# Patient Record
Sex: FEMALE | Race: WHITE | NOT HISPANIC OR LATINO | Employment: OTHER | URBAN - METROPOLITAN AREA
[De-identification: names, ages, dates, MRNs, and addresses within clinical notes are randomized per-mention and may not be internally consistent; named-entity substitution may affect disease eponyms.]

---

## 2019-08-26 ENCOUNTER — RECORDS - HEALTHEAST (OUTPATIENT)
Dept: LAB | Facility: CLINIC | Age: 79
End: 2019-08-26

## 2019-08-26 LAB
ANION GAP SERPL CALCULATED.3IONS-SCNC: 9 MMOL/L (ref 5–18)
BUN SERPL-MCNC: 23 MG/DL (ref 8–28)
CALCIUM SERPL-MCNC: 9.7 MG/DL (ref 8.5–10.5)
CALCIUM, IONIZED MEASURED: 1.35 MMOL/L (ref 1.11–1.3)
CHLORIDE BLD-SCNC: 104 MMOL/L (ref 98–107)
CO2 SERPL-SCNC: 22 MMOL/L (ref 22–31)
CREAT SERPL-MCNC: 1.37 MG/DL (ref 0.6–1.1)
ERYTHROCYTE [DISTWIDTH] IN BLOOD BY AUTOMATED COUNT: 15.2 % (ref 11–14.5)
GFR SERPL CREATININE-BSD FRML MDRD: 37 ML/MIN/1.73M2
GLUCOSE BLD-MCNC: 184 MG/DL (ref 70–125)
HCT VFR BLD AUTO: 32 % (ref 35–47)
HGB BLD-MCNC: 10.3 G/DL (ref 12–16)
ION CA PH 7.4: 1.27 MMOL/L (ref 1.11–1.3)
MCH RBC QN AUTO: 28.1 PG (ref 27–34)
MCHC RBC AUTO-ENTMCNC: 32.2 G/DL (ref 32–36)
MCV RBC AUTO: 87 FL (ref 80–100)
PH: 7.25 (ref 7.35–7.45)
PHOSPHATE SERPL-MCNC: 2.5 MG/DL (ref 2.5–4.5)
PLATELET # BLD AUTO: 260 THOU/UL (ref 140–440)
PMV BLD AUTO: 12.5 FL (ref 8.5–12.5)
POTASSIUM BLD-SCNC: 4.4 MMOL/L (ref 3.5–5)
RBC # BLD AUTO: 3.67 MILL/UL (ref 3.8–5.4)
SODIUM SERPL-SCNC: 135 MMOL/L (ref 136–145)
TSH SERPL DL<=0.005 MIU/L-ACNC: 1.09 UIU/ML (ref 0.3–5)
VIT B12 SERPL-MCNC: 361 PG/ML (ref 213–816)
WBC: 7.3 THOU/UL (ref 4–11)

## 2019-08-30 ENCOUNTER — RECORDS - HEALTHEAST (OUTPATIENT)
Dept: LAB | Facility: CLINIC | Age: 79
End: 2019-08-30

## 2019-09-03 LAB
CALCIUM SERPL-MCNC: 8.1 MG/DL (ref 8.5–10.5)
FERRITIN SERPL-MCNC: 31 NG/ML (ref 10–130)
HGB BLD-MCNC: 8.4 G/DL (ref 12–16)
IRON SATN MFR SERPL: 15 % (ref 20–50)
IRON SERPL-MCNC: 33 UG/DL (ref 42–175)
TIBC SERPL-MCNC: 214 UG/DL (ref 313–563)
TRANSFERRIN SERPL-MCNC: 171 MG/DL (ref 212–360)

## 2020-04-06 ENCOUNTER — AMBULATORY - HEALTHEAST (OUTPATIENT)
Dept: GERIATRICS | Facility: CLINIC | Age: 80
End: 2020-04-06

## 2020-04-07 ENCOUNTER — AMBULATORY - HEALTHEAST (OUTPATIENT)
Dept: ADMINISTRATIVE | Facility: CLINIC | Age: 80
End: 2020-04-07

## 2020-04-07 ENCOUNTER — AMBULATORY - HEALTHEAST (OUTPATIENT)
Dept: GERIATRICS | Facility: CLINIC | Age: 80
End: 2020-04-07

## 2020-04-07 ENCOUNTER — OFFICE VISIT - HEALTHEAST (OUTPATIENT)
Dept: GERIATRICS | Facility: CLINIC | Age: 80
End: 2020-04-07

## 2020-04-07 ENCOUNTER — COMMUNICATION - HEALTHEAST (OUTPATIENT)
Dept: GERIATRICS | Facility: CLINIC | Age: 80
End: 2020-04-07

## 2020-04-07 DIAGNOSIS — F32.89 OTHER DEPRESSION: ICD-10-CM

## 2020-04-07 DIAGNOSIS — G89.29 CHRONIC MIDLINE LOW BACK PAIN WITH SCIATICA, SCIATICA LATERALITY UNSPECIFIED: ICD-10-CM

## 2020-04-07 DIAGNOSIS — M54.40 CHRONIC MIDLINE LOW BACK PAIN WITH SCIATICA, SCIATICA LATERALITY UNSPECIFIED: ICD-10-CM

## 2020-04-07 DIAGNOSIS — K21.9 GASTROESOPHAGEAL REFLUX DISEASE, ESOPHAGITIS PRESENCE NOT SPECIFIED: ICD-10-CM

## 2020-04-07 DIAGNOSIS — D64.9 ANEMIA, UNSPECIFIED TYPE: ICD-10-CM

## 2020-04-07 DIAGNOSIS — E83.52 HYPERCALCEMIA: ICD-10-CM

## 2020-04-07 DIAGNOSIS — R52 PAIN: ICD-10-CM

## 2020-04-07 DIAGNOSIS — N17.9 AKI (ACUTE KIDNEY INJURY) (H): ICD-10-CM

## 2020-04-07 RX ORDER — TRAZODONE HYDROCHLORIDE 150 MG/1
150 TABLET ORAL AT BEDTIME
Status: SHIPPED | COMMUNITY
Start: 2020-04-07

## 2020-04-09 ENCOUNTER — COMMUNICATION - HEALTHEAST (OUTPATIENT)
Dept: GERIATRICS | Facility: CLINIC | Age: 80
End: 2020-04-09

## 2020-04-10 ENCOUNTER — OFFICE VISIT - HEALTHEAST (OUTPATIENT)
Dept: GERIATRICS | Facility: CLINIC | Age: 80
End: 2020-04-10

## 2020-04-10 DIAGNOSIS — E83.52 HYPERCALCEMIA: ICD-10-CM

## 2020-04-10 DIAGNOSIS — N17.9 AKI (ACUTE KIDNEY INJURY) (H): ICD-10-CM

## 2020-04-10 DIAGNOSIS — K59.03 DRUG-INDUCED CONSTIPATION: ICD-10-CM

## 2020-04-13 ENCOUNTER — AMBULATORY - HEALTHEAST (OUTPATIENT)
Dept: GERIATRICS | Facility: CLINIC | Age: 80
End: 2020-04-13

## 2021-01-01 ENCOUNTER — LAB REQUISITION (OUTPATIENT)
Dept: LAB | Facility: CLINIC | Age: 81
End: 2021-01-01
Payer: COMMERCIAL

## 2021-01-01 DIAGNOSIS — E83.52 HYPERCALCEMIA: ICD-10-CM

## 2021-01-01 DIAGNOSIS — K92.2 GASTROINTESTINAL HEMORRHAGE, UNSPECIFIED: ICD-10-CM

## 2021-01-01 DIAGNOSIS — N18.4 CHRONIC KIDNEY DISEASE, STAGE 4 (SEVERE) (H): ICD-10-CM

## 2021-01-01 DIAGNOSIS — R35.0 FREQUENCY OF MICTURITION: ICD-10-CM

## 2021-01-01 DIAGNOSIS — D64.9 ANEMIA, UNSPECIFIED: ICD-10-CM

## 2021-01-01 DIAGNOSIS — I10 ESSENTIAL (PRIMARY) HYPERTENSION: ICD-10-CM

## 2021-01-01 DIAGNOSIS — M48.061 SPINAL STENOSIS, LUMBAR REGION WITHOUT NEUROGENIC CLAUDICATION: ICD-10-CM

## 2021-01-01 DIAGNOSIS — R53.1 WEAKNESS: ICD-10-CM

## 2021-01-01 DIAGNOSIS — N18.9 CHRONIC KIDNEY DISEASE, UNSPECIFIED: ICD-10-CM

## 2021-01-01 DIAGNOSIS — Z94.0 KIDNEY TRANSPLANT STATUS: ICD-10-CM

## 2021-01-01 DIAGNOSIS — N17.9 ACUTE KIDNEY FAILURE, UNSPECIFIED (H): ICD-10-CM

## 2021-01-01 DIAGNOSIS — K92.0 HEMATEMESIS: ICD-10-CM

## 2021-01-01 LAB
ALBUMIN UR-MCNC: 100 MG/DL
ALBUMIN UR-MCNC: 200 MG/DL
ANION GAP SERPL CALCULATED.3IONS-SCNC: 10 MMOL/L (ref 5–18)
ANION GAP SERPL CALCULATED.3IONS-SCNC: 6 MMOL/L (ref 5–18)
ANION GAP SERPL CALCULATED.3IONS-SCNC: 7 MMOL/L (ref 5–18)
ANION GAP SERPL CALCULATED.3IONS-SCNC: 7 MMOL/L (ref 5–18)
ANION GAP SERPL CALCULATED.3IONS-SCNC: 8 MMOL/L (ref 5–18)
ANION GAP SERPL CALCULATED.3IONS-SCNC: 8 MMOL/L (ref 5–18)
ANION GAP SERPL CALCULATED.3IONS-SCNC: 9 MMOL/L (ref 5–18)
APPEARANCE UR: CLEAR
APPEARANCE UR: CLEAR
BACTERIA #/AREA URNS HPF: ABNORMAL /HPF
BACTERIA #/AREA URNS HPF: ABNORMAL /HPF
BACTERIA UR CULT: ABNORMAL
BACTERIA UR CULT: ABNORMAL
BASOPHILS # BLD AUTO: 0 10E3/UL (ref 0–0.2)
BASOPHILS NFR BLD AUTO: 1 %
BILIRUB UR QL STRIP: NEGATIVE
BILIRUB UR QL STRIP: NEGATIVE
BUN SERPL-MCNC: 30 MG/DL (ref 8–28)
BUN SERPL-MCNC: 30 MG/DL (ref 8–28)
BUN SERPL-MCNC: 32 MG/DL (ref 8–28)
BUN SERPL-MCNC: 34 MG/DL (ref 8–28)
BUN SERPL-MCNC: 38 MG/DL (ref 8–28)
BUN SERPL-MCNC: 43 MG/DL (ref 8–28)
BUN SERPL-MCNC: 46 MG/DL (ref 8–28)
BUN SERPL-MCNC: 47 MG/DL (ref 8–28)
BUN SERPL-MCNC: 50 MG/DL (ref 8–28)
CALCIUM SERPL-MCNC: 8.6 MG/DL (ref 8.5–10.5)
CALCIUM SERPL-MCNC: 8.7 MG/DL (ref 8.5–10.5)
CALCIUM SERPL-MCNC: 8.7 MG/DL (ref 8.5–10.5)
CALCIUM SERPL-MCNC: 8.9 MG/DL (ref 8.5–10.5)
CALCIUM SERPL-MCNC: 9.1 MG/DL (ref 8.5–10.5)
CALCIUM SERPL-MCNC: 9.1 MG/DL (ref 8.5–10.5)
CALCIUM SERPL-MCNC: 9.4 MG/DL (ref 8.5–10.5)
CALCIUM SERPL-MCNC: 9.4 MG/DL (ref 8.5–10.5)
CALCIUM SERPL-MCNC: 9.7 MG/DL (ref 8.5–10.5)
CHLORIDE BLD-SCNC: 109 MMOL/L (ref 98–107)
CHLORIDE BLD-SCNC: 110 MMOL/L (ref 98–107)
CHLORIDE BLD-SCNC: 112 MMOL/L (ref 98–107)
CHLORIDE BLD-SCNC: 114 MMOL/L (ref 98–107)
CHLORIDE BLD-SCNC: 114 MMOL/L (ref 98–107)
CHLORIDE BLD-SCNC: 116 MMOL/L (ref 98–107)
CHLORIDE BLD-SCNC: 116 MMOL/L (ref 98–107)
CHLORIDE BLD-SCNC: 117 MMOL/L (ref 98–107)
CHLORIDE BLD-SCNC: 118 MMOL/L (ref 98–107)
CO2 SERPL-SCNC: 15 MMOL/L (ref 22–31)
CO2 SERPL-SCNC: 16 MMOL/L (ref 22–31)
CO2 SERPL-SCNC: 17 MMOL/L (ref 22–31)
CO2 SERPL-SCNC: 17 MMOL/L (ref 22–31)
CO2 SERPL-SCNC: 18 MMOL/L (ref 22–31)
CO2 SERPL-SCNC: 19 MMOL/L (ref 22–31)
CO2 SERPL-SCNC: 21 MMOL/L (ref 22–31)
COLOR UR AUTO: ABNORMAL
COLOR UR AUTO: ABNORMAL
CREAT SERPL-MCNC: 1.66 MG/DL (ref 0.6–1.1)
CREAT SERPL-MCNC: 1.69 MG/DL (ref 0.6–1.1)
CREAT SERPL-MCNC: 1.74 MG/DL (ref 0.6–1.1)
CREAT SERPL-MCNC: 1.77 MG/DL (ref 0.6–1.1)
CREAT SERPL-MCNC: 1.79 MG/DL (ref 0.6–1.1)
CREAT SERPL-MCNC: 1.96 MG/DL (ref 0.6–1.1)
CREAT SERPL-MCNC: 2.11 MG/DL (ref 0.6–1.1)
CREAT SERPL-MCNC: 2.23 MG/DL (ref 0.6–1.1)
CREAT SERPL-MCNC: 2.53 MG/DL (ref 0.6–1.1)
EOSINOPHIL # BLD AUTO: 0.1 10E3/UL (ref 0–0.7)
EOSINOPHIL NFR BLD AUTO: 2 %
ERYTHROCYTE [DISTWIDTH] IN BLOOD BY AUTOMATED COUNT: 16.2 % (ref 10–15)
ERYTHROCYTE [DISTWIDTH] IN BLOOD BY AUTOMATED COUNT: 18.3 % (ref 10–15)
ERYTHROCYTE [DISTWIDTH] IN BLOOD BY AUTOMATED COUNT: 19.9 % (ref 10–15)
ERYTHROCYTE [DISTWIDTH] IN BLOOD BY AUTOMATED COUNT: 20.4 % (ref 10–15)
ERYTHROCYTE [DISTWIDTH] IN BLOOD BY AUTOMATED COUNT: 21.6 % (ref 10–15)
ERYTHROCYTE [SEDIMENTATION RATE] IN BLOOD BY WESTERGREN METHOD: 22 MM/HR (ref 0–20)
FERRITIN SERPL-MCNC: 23 NG/ML (ref 10–130)
GFR SERPL CREATININE-BSD FRML MDRD: 17 ML/MIN/1.73M2
GFR SERPL CREATININE-BSD FRML MDRD: 20 ML/MIN/1.73M2
GFR SERPL CREATININE-BSD FRML MDRD: 21 ML/MIN/1.73M2
GFR SERPL CREATININE-BSD FRML MDRD: 23 ML/MIN/1.73M2
GFR SERPL CREATININE-BSD FRML MDRD: 26 ML/MIN/1.73M2
GFR SERPL CREATININE-BSD FRML MDRD: 26 ML/MIN/1.73M2
GFR SERPL CREATININE-BSD FRML MDRD: 27 ML/MIN/1.73M2
GFR SERPL CREATININE-BSD FRML MDRD: 30 ML/MIN/1.73M2
GFR SERPL CREATININE-BSD FRML MDRD: 30 ML/MIN/1.73M2
GLUCOSE BLD-MCNC: 106 MG/DL (ref 70–125)
GLUCOSE BLD-MCNC: 124 MG/DL (ref 70–125)
GLUCOSE BLD-MCNC: 126 MG/DL (ref 70–125)
GLUCOSE BLD-MCNC: 127 MG/DL (ref 70–125)
GLUCOSE BLD-MCNC: 142 MG/DL (ref 70–125)
GLUCOSE BLD-MCNC: 171 MG/DL (ref 70–125)
GLUCOSE BLD-MCNC: 210 MG/DL (ref 70–125)
GLUCOSE BLD-MCNC: 76 MG/DL (ref 70–125)
GLUCOSE BLD-MCNC: 84 MG/DL (ref 70–125)
GLUCOSE UR STRIP-MCNC: NEGATIVE MG/DL
GLUCOSE UR STRIP-MCNC: NEGATIVE MG/DL
HAPTOGLOB SERPL-MCNC: 175 MG/DL (ref 33–171)
HCT VFR BLD AUTO: 22.7 % (ref 35–47)
HCT VFR BLD AUTO: 24.5 % (ref 35–47)
HCT VFR BLD AUTO: 28.3 % (ref 35–47)
HCT VFR BLD AUTO: 28.4 % (ref 35–47)
HCT VFR BLD AUTO: 29.5 % (ref 35–47)
HEMOCCULT STL QL: POSITIVE
HGB BLD-MCNC: 7 G/DL (ref 11.7–15.7)
HGB BLD-MCNC: 7.6 G/DL (ref 11.7–15.7)
HGB BLD-MCNC: 8.7 G/DL (ref 11.7–15.7)
HGB BLD-MCNC: 9 G/DL (ref 11.7–15.7)
HGB BLD-MCNC: 9.5 G/DL (ref 11.7–15.7)
HGB UR QL STRIP: ABNORMAL
HGB UR QL STRIP: NEGATIVE
IMM GRANULOCYTES # BLD: 0.1 10E3/UL
IMM GRANULOCYTES NFR BLD: 2 %
IRON SATN MFR SERPL: 10 % (ref 20–50)
IRON SERPL-MCNC: 25 UG/DL (ref 42–175)
KETONES UR STRIP-MCNC: NEGATIVE MG/DL
KETONES UR STRIP-MCNC: NEGATIVE MG/DL
LEUKOCYTE ESTERASE UR QL STRIP: ABNORMAL
LEUKOCYTE ESTERASE UR QL STRIP: ABNORMAL
LYMPHOCYTES # BLD AUTO: 1.3 10E3/UL (ref 0.8–5.3)
LYMPHOCYTES NFR BLD AUTO: 22 %
MCH RBC QN AUTO: 30.7 PG (ref 26.5–33)
MCH RBC QN AUTO: 31 PG (ref 26.5–33)
MCH RBC QN AUTO: 31.5 PG (ref 26.5–33)
MCH RBC QN AUTO: 31.8 PG (ref 26.5–33)
MCH RBC QN AUTO: 32.5 PG (ref 26.5–33)
MCHC RBC AUTO-ENTMCNC: 30.6 G/DL (ref 31.5–36.5)
MCHC RBC AUTO-ENTMCNC: 30.8 G/DL (ref 31.5–36.5)
MCHC RBC AUTO-ENTMCNC: 31 G/DL (ref 31.5–36.5)
MCHC RBC AUTO-ENTMCNC: 31.8 G/DL (ref 31.5–36.5)
MCHC RBC AUTO-ENTMCNC: 32.2 G/DL (ref 31.5–36.5)
MCV RBC AUTO: 100 FL (ref 78–100)
MCV RBC AUTO: 100 FL (ref 78–100)
MCV RBC AUTO: 105 FL (ref 78–100)
MCV RBC AUTO: 99 FL (ref 78–100)
MCV RBC AUTO: 99 FL (ref 78–100)
MONOCYTES # BLD AUTO: 0.6 10E3/UL (ref 0–1.3)
MONOCYTES NFR BLD AUTO: 10 %
MUCOUS THREADS #/AREA URNS LPF: PRESENT /LPF
NEUTROPHILS # BLD AUTO: 3.9 10E3/UL (ref 1.6–8.3)
NEUTROPHILS NFR BLD AUTO: 63 %
NITRATE UR QL: NEGATIVE
NITRATE UR QL: NEGATIVE
NRBC # BLD AUTO: 0 10E3/UL
NRBC BLD AUTO-RTO: 1 /100
PH UR STRIP: 5.5 [PH] (ref 5–7)
PH UR STRIP: 6.5 [PH] (ref 5–7)
PLATELET # BLD AUTO: 143 10E3/UL (ref 150–450)
PLATELET # BLD AUTO: 184 10E3/UL (ref 150–450)
PLATELET # BLD AUTO: 202 10E3/UL (ref 150–450)
PLATELET # BLD AUTO: 242 10E3/UL (ref 150–450)
PLATELET # BLD AUTO: 313 10E3/UL (ref 150–450)
POTASSIUM BLD-SCNC: 4 MMOL/L (ref 3.5–5)
POTASSIUM BLD-SCNC: 4.1 MMOL/L (ref 3.5–5)
POTASSIUM BLD-SCNC: 4.1 MMOL/L (ref 3.5–5)
POTASSIUM BLD-SCNC: 4.2 MMOL/L (ref 3.5–5)
POTASSIUM BLD-SCNC: 4.3 MMOL/L (ref 3.5–5)
POTASSIUM BLD-SCNC: 4.4 MMOL/L (ref 3.5–5)
POTASSIUM BLD-SCNC: 4.4 MMOL/L (ref 3.5–5)
POTASSIUM BLD-SCNC: 4.7 MMOL/L (ref 3.5–5)
POTASSIUM BLD-SCNC: 5 MMOL/L (ref 3.5–5)
RBC # BLD AUTO: 2.26 10E6/UL (ref 3.8–5.2)
RBC # BLD AUTO: 2.34 10E6/UL (ref 3.8–5.2)
RBC # BLD AUTO: 2.83 10E6/UL (ref 3.8–5.2)
RBC # BLD AUTO: 2.86 10E6/UL (ref 3.8–5.2)
RBC # BLD AUTO: 2.99 10E6/UL (ref 3.8–5.2)
RBC URINE: 1 /HPF
RBC URINE: <1 /HPF
RETICS # AUTO: 0.1 10E6/UL (ref 0.01–0.11)
RETICS/RBC NFR AUTO: 4.2 % (ref 0.8–2.7)
SODIUM SERPL-SCNC: 136 MMOL/L (ref 136–145)
SODIUM SERPL-SCNC: 138 MMOL/L (ref 136–145)
SODIUM SERPL-SCNC: 139 MMOL/L (ref 136–145)
SODIUM SERPL-SCNC: 140 MMOL/L (ref 136–145)
SODIUM SERPL-SCNC: 140 MMOL/L (ref 136–145)
SODIUM SERPL-SCNC: 141 MMOL/L (ref 136–145)
SODIUM SERPL-SCNC: 142 MMOL/L (ref 136–145)
SP GR UR STRIP: 1.01 (ref 1–1.03)
SP GR UR STRIP: 1.01 (ref 1–1.03)
SQUAMOUS EPITHELIAL: <1 /HPF
SQUAMOUS EPITHELIAL: <1 /HPF
TIBC SERPL-MCNC: 246 UG/DL (ref 313–563)
TOTAL PROTEIN SERUM FOR ELP: 4.6 G/DL (ref 6–8)
TRANSFERRIN SERPL-MCNC: 197 MG/DL (ref 212–360)
TSH SERPL DL<=0.005 MIU/L-ACNC: 2.42 UIU/ML (ref 0.3–5)
UROBILINOGEN UR STRIP-MCNC: <2 MG/DL
UROBILINOGEN UR STRIP-MCNC: <2 MG/DL
VIT B12 SERPL-MCNC: 213 PG/ML (ref 213–816)
WBC # BLD AUTO: 10.9 10E3/UL (ref 4–11)
WBC # BLD AUTO: 5.9 10E3/UL (ref 4–11)
WBC # BLD AUTO: 6.1 10E3/UL (ref 4–11)
WBC # BLD AUTO: 7.2 10E3/UL (ref 4–11)
WBC # BLD AUTO: 7.4 10E3/UL (ref 4–11)
WBC URINE: 34 /HPF
WBC URINE: 53 /HPF

## 2021-01-01 PROCEDURE — 85027 COMPLETE CBC AUTOMATED: CPT | Mod: ORL | Performed by: NURSE PRACTITIONER

## 2021-01-01 PROCEDURE — 84466 ASSAY OF TRANSFERRIN: CPT | Mod: ORL | Performed by: NURSE PRACTITIONER

## 2021-01-01 PROCEDURE — 84165 PROTEIN E-PHORESIS SERUM: CPT | Mod: TC,ORL | Performed by: NURSE PRACTITIONER

## 2021-01-01 PROCEDURE — 82728 ASSAY OF FERRITIN: CPT | Mod: ORL | Performed by: NURSE PRACTITIONER

## 2021-01-01 PROCEDURE — P9604 ONE-WAY ALLOW PRORATED TRIP: HCPCS | Performed by: FAMILY MEDICINE

## 2021-01-01 PROCEDURE — 83010 ASSAY OF HAPTOGLOBIN QUANT: CPT | Mod: ORL | Performed by: NURSE PRACTITIONER

## 2021-01-01 PROCEDURE — 36415 COLL VENOUS BLD VENIPUNCTURE: CPT | Performed by: NURSE PRACTITIONER

## 2021-01-01 PROCEDURE — P9604 ONE-WAY ALLOW PRORATED TRIP: HCPCS | Mod: ORL | Performed by: NURSE PRACTITIONER

## 2021-01-01 PROCEDURE — 85018 HEMOGLOBIN: CPT | Mod: ORL | Performed by: NURSE PRACTITIONER

## 2021-01-01 PROCEDURE — 36415 COLL VENOUS BLD VENIPUNCTURE: CPT | Performed by: FAMILY MEDICINE

## 2021-01-01 PROCEDURE — 82310 ASSAY OF CALCIUM: CPT | Performed by: FAMILY MEDICINE

## 2021-01-01 PROCEDURE — 80048 BASIC METABOLIC PNL TOTAL CA: CPT | Mod: ORL | Performed by: NURSE PRACTITIONER

## 2021-01-01 PROCEDURE — 36415 COLL VENOUS BLD VENIPUNCTURE: CPT | Mod: ORL | Performed by: NURSE PRACTITIONER

## 2021-01-01 PROCEDURE — 81001 URINALYSIS AUTO W/SCOPE: CPT | Mod: ORL | Performed by: NURSE PRACTITIONER

## 2021-01-01 PROCEDURE — 85027 COMPLETE CBC AUTOMATED: CPT | Performed by: FAMILY MEDICINE

## 2021-01-01 PROCEDURE — 80048 BASIC METABOLIC PNL TOTAL CA: CPT | Performed by: NURSE PRACTITIONER

## 2021-01-01 PROCEDURE — 85045 AUTOMATED RETICULOCYTE COUNT: CPT | Mod: ORL | Performed by: NURSE PRACTITIONER

## 2021-01-01 PROCEDURE — 80048 BASIC METABOLIC PNL TOTAL CA: CPT | Performed by: FAMILY MEDICINE

## 2021-01-01 PROCEDURE — 84165 PROTEIN E-PHORESIS SERUM: CPT | Mod: 26 | Performed by: PATHOLOGY

## 2021-01-01 PROCEDURE — P9604 ONE-WAY ALLOW PRORATED TRIP: HCPCS | Performed by: NURSE PRACTITIONER

## 2021-01-01 PROCEDURE — P9603 ONE-WAY ALLOW PRORATED MILES: HCPCS | Performed by: NURSE PRACTITIONER

## 2021-01-01 PROCEDURE — 82272 OCCULT BLD FECES 1-3 TESTS: CPT | Mod: ORL | Performed by: NURSE PRACTITIONER

## 2021-01-01 PROCEDURE — 84443 ASSAY THYROID STIM HORMONE: CPT | Mod: ORL | Performed by: NURSE PRACTITIONER

## 2021-01-01 PROCEDURE — 81001 URINALYSIS AUTO W/SCOPE: CPT | Mod: ORL | Performed by: INTERNAL MEDICINE

## 2021-01-01 PROCEDURE — 82310 ASSAY OF CALCIUM: CPT | Performed by: NURSE PRACTITIONER

## 2021-01-01 PROCEDURE — P9603 ONE-WAY ALLOW PRORATED MILES: HCPCS | Mod: ORL | Performed by: NURSE PRACTITIONER

## 2021-01-01 PROCEDURE — 85025 COMPLETE CBC W/AUTO DIFF WBC: CPT | Mod: ORL | Performed by: NURSE PRACTITIONER

## 2021-01-01 PROCEDURE — 82607 VITAMIN B-12: CPT | Mod: ORL | Performed by: NURSE PRACTITIONER

## 2021-01-01 PROCEDURE — 85652 RBC SED RATE AUTOMATED: CPT | Mod: ORL | Performed by: NURSE PRACTITIONER

## 2021-01-01 PROCEDURE — 84155 ASSAY OF PROTEIN SERUM: CPT | Mod: ORL | Performed by: NURSE PRACTITIONER

## 2021-01-01 PROCEDURE — 87088 URINE BACTERIA CULTURE: CPT | Mod: ORL | Performed by: NURSE PRACTITIONER

## 2021-01-22 ENCOUNTER — AMBULATORY - HEALTHEAST (OUTPATIENT)
Dept: GERIATRICS | Facility: CLINIC | Age: 81
End: 2021-01-22

## 2021-01-22 RX ORDER — FAMOTIDINE 40 MG/1
40 TABLET, FILM COATED ORAL DAILY
Status: SHIPPED | COMMUNITY
Start: 2021-01-22 | End: 2021-07-26

## 2021-01-25 ENCOUNTER — RECORDS - HEALTHEAST (OUTPATIENT)
Dept: LAB | Facility: CLINIC | Age: 81
End: 2021-01-25

## 2021-01-25 ENCOUNTER — OFFICE VISIT - HEALTHEAST (OUTPATIENT)
Dept: GERIATRICS | Facility: CLINIC | Age: 81
End: 2021-01-25

## 2021-01-25 DIAGNOSIS — R21 RASH: ICD-10-CM

## 2021-01-25 DIAGNOSIS — N17.9 AKI (ACUTE KIDNEY INJURY) (H): ICD-10-CM

## 2021-01-25 DIAGNOSIS — R62.7 FTT (FAILURE TO THRIVE) IN ADULT: ICD-10-CM

## 2021-01-25 ASSESSMENT — MIFFLIN-ST. JEOR: SCORE: 965.72

## 2021-01-26 ENCOUNTER — OFFICE VISIT - HEALTHEAST (OUTPATIENT)
Dept: GERIATRICS | Facility: CLINIC | Age: 81
End: 2021-01-26

## 2021-01-26 DIAGNOSIS — R21 RASH: ICD-10-CM

## 2021-01-26 DIAGNOSIS — R62.7 FTT (FAILURE TO THRIVE) IN ADULT: ICD-10-CM

## 2021-01-26 LAB
ANION GAP SERPL CALCULATED.3IONS-SCNC: 9 MMOL/L (ref 5–18)
BUN SERPL-MCNC: 37 MG/DL (ref 8–28)
CALCIUM SERPL-MCNC: 9.1 MG/DL (ref 8.5–10.5)
CHLORIDE BLD-SCNC: 99 MMOL/L (ref 98–107)
CO2 SERPL-SCNC: 23 MMOL/L (ref 22–31)
CREAT SERPL-MCNC: 1.83 MG/DL (ref 0.6–1.1)
ERYTHROCYTE [DISTWIDTH] IN BLOOD BY AUTOMATED COUNT: 15.9 % (ref 11–14.5)
GFR SERPL CREATININE-BSD FRML MDRD: 26 ML/MIN/1.73M2
GLUCOSE BLD-MCNC: 97 MG/DL (ref 70–125)
HCT VFR BLD AUTO: 28.4 % (ref 35–47)
HGB BLD-MCNC: 9.4 G/DL (ref 12–16)
MAGNESIUM SERPL-MCNC: 2.1 MG/DL (ref 1.8–2.6)
MCH RBC QN AUTO: 26.3 PG (ref 27–34)
MCHC RBC AUTO-ENTMCNC: 33.1 G/DL (ref 32–36)
MCV RBC AUTO: 79 FL (ref 80–100)
PLATELET # BLD AUTO: 185 THOU/UL (ref 140–440)
PMV BLD AUTO: 12.7 FL (ref 8.5–12.5)
POTASSIUM BLD-SCNC: 4.1 MMOL/L (ref 3.5–5)
RBC # BLD AUTO: 3.58 MILL/UL (ref 3.8–5.4)
SODIUM SERPL-SCNC: 131 MMOL/L (ref 136–145)
WBC: 6.2 THOU/UL (ref 4–11)

## 2021-01-26 ASSESSMENT — MIFFLIN-ST. JEOR: SCORE: 965.72

## 2021-01-27 ENCOUNTER — RECORDS - HEALTHEAST (OUTPATIENT)
Dept: LAB | Facility: CLINIC | Age: 81
End: 2021-01-27

## 2021-01-27 ENCOUNTER — OFFICE VISIT - HEALTHEAST (OUTPATIENT)
Dept: GERIATRICS | Facility: CLINIC | Age: 81
End: 2021-01-27

## 2021-01-27 DIAGNOSIS — R21 RASH: ICD-10-CM

## 2021-01-27 DIAGNOSIS — R62.7 FTT (FAILURE TO THRIVE) IN ADULT: ICD-10-CM

## 2021-01-27 LAB
ALBUMIN UR-MCNC: ABNORMAL MG/DL
AMORPH CRY #/AREA URNS HPF: ABNORMAL /[HPF]
APPEARANCE UR: CLEAR
BACTERIA #/AREA URNS HPF: ABNORMAL HPF
BILIRUB UR QL STRIP: NEGATIVE
COLOR UR AUTO: ABNORMAL
GLUCOSE UR STRIP-MCNC: ABNORMAL MG/DL
HGB UR QL STRIP: NEGATIVE
KETONES UR STRIP-MCNC: NEGATIVE MG/DL
LEUKOCYTE ESTERASE UR QL STRIP: NEGATIVE
NITRATE UR QL: NEGATIVE
OSMOLALITY UR: 329 MOSM/KG (ref 300–900)
PH UR STRIP: 5.5 [PH] (ref 4.5–8)
RBC #/AREA URNS AUTO: ABNORMAL HPF
SODIUM UR-SCNC: <20 MMOL/L
SP GR UR STRIP: 1.01 (ref 1–1.03)
SQUAMOUS #/AREA URNS AUTO: ABNORMAL LPF
UROBILINOGEN UR STRIP-ACNC: ABNORMAL
WBC #/AREA URNS AUTO: ABNORMAL HPF

## 2021-01-27 ASSESSMENT — MIFFLIN-ST. JEOR: SCORE: 965.72

## 2021-01-28 ENCOUNTER — OFFICE VISIT - HEALTHEAST (OUTPATIENT)
Dept: GERIATRICS | Facility: CLINIC | Age: 81
End: 2021-01-28

## 2021-01-28 DIAGNOSIS — B02.9 HERPES ZOSTER WITHOUT COMPLICATION: ICD-10-CM

## 2021-01-28 DIAGNOSIS — E87.1 HYPONATREMIA: ICD-10-CM

## 2021-01-28 DIAGNOSIS — D64.9 ANEMIA, UNSPECIFIED TYPE: ICD-10-CM

## 2021-01-28 DIAGNOSIS — R62.7 FTT (FAILURE TO THRIVE) IN ADULT: ICD-10-CM

## 2021-01-28 LAB
ANION GAP SERPL CALCULATED.3IONS-SCNC: 7 MMOL/L (ref 5–18)
BUN SERPL-MCNC: 37 MG/DL (ref 8–28)
CALCIUM SERPL-MCNC: 8.8 MG/DL (ref 8.5–10.5)
CHLORIDE BLD-SCNC: 103 MMOL/L (ref 98–107)
CO2 SERPL-SCNC: 23 MMOL/L (ref 22–31)
CREAT SERPL-MCNC: 1.43 MG/DL (ref 0.6–1.1)
GFR SERPL CREATININE-BSD FRML MDRD: 35 ML/MIN/1.73M2
GLUCOSE BLD-MCNC: 119 MG/DL (ref 70–125)
HGB BLD-MCNC: 8.4 G/DL (ref 12–16)
POTASSIUM BLD-SCNC: 3.9 MMOL/L (ref 3.5–5)
SODIUM SERPL-SCNC: 133 MMOL/L (ref 136–145)

## 2021-01-28 ASSESSMENT — MIFFLIN-ST. JEOR: SCORE: 920.36

## 2021-01-30 ENCOUNTER — RECORDS - HEALTHEAST (OUTPATIENT)
Dept: LAB | Facility: CLINIC | Age: 81
End: 2021-01-30

## 2021-01-30 LAB
ALBUMIN UR-MCNC: ABNORMAL MG/DL
APPEARANCE UR: ABNORMAL
BACTERIA #/AREA URNS HPF: ABNORMAL HPF
BILIRUB UR QL STRIP: NEGATIVE
COLOR UR AUTO: YELLOW
GLUCOSE UR STRIP-MCNC: ABNORMAL MG/DL
HGB UR QL STRIP: ABNORMAL
KETONES UR STRIP-MCNC: NEGATIVE MG/DL
LEUKOCYTE ESTERASE UR QL STRIP: ABNORMAL
NITRATE UR QL: NEGATIVE
PH UR STRIP: 6 [PH] (ref 4.5–8)
RBC #/AREA URNS AUTO: ABNORMAL HPF
SP GR UR STRIP: 1.01 (ref 1–1.03)
SQUAMOUS #/AREA URNS AUTO: ABNORMAL LPF
TRANS CELLS #/AREA URNS HPF: ABNORMAL LPF
UROBILINOGEN UR STRIP-ACNC: ABNORMAL
WBC #/AREA URNS AUTO: ABNORMAL HPF
WBC CLUMPS #/AREA URNS HPF: PRESENT /[HPF]

## 2021-01-31 ENCOUNTER — RECORDS - HEALTHEAST (OUTPATIENT)
Dept: LAB | Facility: CLINIC | Age: 81
End: 2021-01-31

## 2021-01-31 LAB
ALBUMIN UR-MCNC: ABNORMAL MG/DL
AMORPH CRY #/AREA URNS HPF: ABNORMAL /[HPF]
APPEARANCE UR: ABNORMAL
BACTERIA #/AREA URNS HPF: ABNORMAL HPF
BILIRUB UR QL STRIP: NEGATIVE
COLOR UR AUTO: YELLOW
GLUCOSE UR STRIP-MCNC: ABNORMAL MG/DL
HGB UR QL STRIP: NEGATIVE
KETONES UR STRIP-MCNC: NEGATIVE MG/DL
LEUKOCYTE ESTERASE UR QL STRIP: ABNORMAL
MUCOUS THREADS #/AREA URNS LPF: ABNORMAL LPF
NITRATE UR QL: NEGATIVE
PH UR STRIP: 6 [PH] (ref 4.5–8)
RBC #/AREA URNS AUTO: ABNORMAL HPF
SP GR UR STRIP: 1.01 (ref 1–1.03)
SQUAMOUS #/AREA URNS AUTO: ABNORMAL LPF
UROBILINOGEN UR STRIP-ACNC: ABNORMAL
WBC #/AREA URNS AUTO: ABNORMAL HPF

## 2021-02-02 ENCOUNTER — RECORDS - HEALTHEAST (OUTPATIENT)
Dept: LAB | Facility: CLINIC | Age: 81
End: 2021-02-02

## 2021-02-02 ENCOUNTER — OFFICE VISIT - HEALTHEAST (OUTPATIENT)
Dept: GERIATRICS | Facility: CLINIC | Age: 81
End: 2021-02-02

## 2021-02-02 DIAGNOSIS — G89.29 CHRONIC MIDLINE LOW BACK PAIN WITH SCIATICA, SCIATICA LATERALITY UNSPECIFIED: ICD-10-CM

## 2021-02-02 DIAGNOSIS — R62.7 FTT (FAILURE TO THRIVE) IN ADULT: ICD-10-CM

## 2021-02-02 DIAGNOSIS — M54.40 CHRONIC MIDLINE LOW BACK PAIN WITH SCIATICA, SCIATICA LATERALITY UNSPECIFIED: ICD-10-CM

## 2021-02-02 DIAGNOSIS — J18.9 COMMUNITY ACQUIRED PNEUMONIA, UNSPECIFIED LATERALITY: ICD-10-CM

## 2021-02-02 DIAGNOSIS — F51.01 PRIMARY INSOMNIA: ICD-10-CM

## 2021-02-02 DIAGNOSIS — R21 RASH: ICD-10-CM

## 2021-02-02 DIAGNOSIS — E87.1 HYPONATREMIA: ICD-10-CM

## 2021-02-02 DIAGNOSIS — N18.30 STAGE 3 CHRONIC KIDNEY DISEASE, UNSPECIFIED WHETHER STAGE 3A OR 3B CKD (H): ICD-10-CM

## 2021-02-02 DIAGNOSIS — F11.20 UNCOMPLICATED OPIOID DEPENDENCE (H): ICD-10-CM

## 2021-02-02 DIAGNOSIS — M48.061 LUMBAR FORAMINAL STENOSIS: ICD-10-CM

## 2021-02-02 DIAGNOSIS — B02.9 HERPES ZOSTER WITHOUT COMPLICATION: ICD-10-CM

## 2021-02-02 LAB
ANION GAP SERPL CALCULATED.3IONS-SCNC: 8 MMOL/L (ref 5–18)
BUN SERPL-MCNC: 26 MG/DL (ref 8–28)
CALCIUM SERPL-MCNC: 8.9 MG/DL (ref 8.5–10.5)
CHLORIDE BLD-SCNC: 108 MMOL/L (ref 98–107)
CO2 SERPL-SCNC: 22 MMOL/L (ref 22–31)
CREAT SERPL-MCNC: 1.49 MG/DL (ref 0.6–1.1)
ERYTHROCYTE [DISTWIDTH] IN BLOOD BY AUTOMATED COUNT: 16.8 % (ref 11–14.5)
GFR SERPL CREATININE-BSD FRML MDRD: 34 ML/MIN/1.73M2
GLUCOSE BLD-MCNC: 102 MG/DL (ref 70–125)
HCT VFR BLD AUTO: 22.3 % (ref 35–47)
HGB BLD-MCNC: 7 G/DL (ref 12–16)
MCH RBC QN AUTO: 25.8 PG (ref 27–34)
MCHC RBC AUTO-ENTMCNC: 31.4 G/DL (ref 32–36)
MCV RBC AUTO: 82 FL (ref 80–100)
OSMOLALITY SERPL: 289 MOSM/KG (ref 270–300)
PLATELET # BLD AUTO: 277 THOU/UL (ref 140–440)
PMV BLD AUTO: 11.4 FL (ref 8.5–12.5)
POTASSIUM BLD-SCNC: 4.4 MMOL/L (ref 3.5–5)
RBC # BLD AUTO: 2.71 MILL/UL (ref 3.8–5.4)
SODIUM SERPL-SCNC: 138 MMOL/L (ref 136–145)
WBC: 6.8 THOU/UL (ref 4–11)

## 2021-02-02 ASSESSMENT — MIFFLIN-ST. JEOR: SCORE: 974.34

## 2021-02-03 ENCOUNTER — RECORDS - HEALTHEAST (OUTPATIENT)
Dept: ADMINISTRATIVE | Facility: OTHER | Age: 81
End: 2021-02-03

## 2021-02-03 ENCOUNTER — OFFICE VISIT - HEALTHEAST (OUTPATIENT)
Dept: GERIATRICS | Facility: CLINIC | Age: 81
End: 2021-02-03

## 2021-02-03 DIAGNOSIS — D64.9 ANEMIA, UNSPECIFIED TYPE: ICD-10-CM

## 2021-02-03 DIAGNOSIS — R21 RASH: ICD-10-CM

## 2021-02-03 LAB — HGB BLD-MCNC: 7.1 G/DL (ref 12–16)

## 2021-02-03 RX ORDER — GABAPENTIN 100 MG/1
300 CAPSULE ORAL 2 TIMES DAILY
Status: SHIPPED | COMMUNITY
Start: 2021-02-03 | End: 2021-07-26

## 2021-02-03 ASSESSMENT — MIFFLIN-ST. JEOR: SCORE: 974.34

## 2021-02-04 ENCOUNTER — RECORDS - HEALTHEAST (OUTPATIENT)
Dept: LAB | Facility: CLINIC | Age: 81
End: 2021-02-04

## 2021-02-04 LAB
ALBUMIN UR-MCNC: ABNORMAL MG/DL
AMORPH CRY #/AREA URNS HPF: ABNORMAL /[HPF]
APPEARANCE UR: CLEAR
BACTERIA #/AREA URNS HPF: ABNORMAL HPF
BILIRUB UR QL STRIP: NEGATIVE
CAOX CRY #/AREA URNS HPF: PRESENT /[HPF]
COLOR UR AUTO: ABNORMAL
GLUCOSE UR STRIP-MCNC: ABNORMAL MG/DL
HGB UR QL STRIP: NEGATIVE
KETONES UR STRIP-MCNC: NEGATIVE MG/DL
LEUKOCYTE ESTERASE UR QL STRIP: ABNORMAL
NITRATE UR QL: NEGATIVE
OSMOLALITY UR: 378 MOSM/KG (ref 300–900)
PH UR STRIP: 6 [PH] (ref 4.5–8)
RBC #/AREA URNS AUTO: ABNORMAL HPF
SODIUM UR-SCNC: 45 MMOL/L
SP GR UR STRIP: 1.01 (ref 1–1.03)
SQUAMOUS #/AREA URNS AUTO: ABNORMAL LPF
UROBILINOGEN UR STRIP-ACNC: ABNORMAL
WBC #/AREA URNS AUTO: ABNORMAL HPF

## 2021-02-05 ENCOUNTER — COMMUNICATION - HEALTHEAST (OUTPATIENT)
Dept: GERIATRICS | Facility: CLINIC | Age: 81
End: 2021-02-05

## 2021-02-05 LAB
FERRITIN SERPL-MCNC: 14 NG/ML (ref 10–130)
HGB BLD-MCNC: 7.2 G/DL (ref 12–16)
IRON SATN MFR SERPL: 13 % (ref 20–50)
IRON SERPL-MCNC: 28 UG/DL (ref 42–175)
IRON SERPL-MCNC: 28 UG/DL (ref 42–175)
TIBC SERPL-MCNC: 222 UG/DL (ref 313–563)
TRANSFERRIN SERPL-MCNC: 178 MG/DL (ref 212–360)
VIT B12 SERPL-MCNC: 307 PG/ML (ref 213–816)

## 2021-02-08 ENCOUNTER — AMBULATORY - HEALTHEAST (OUTPATIENT)
Dept: GERIATRICS | Facility: CLINIC | Age: 81
End: 2021-02-08

## 2021-04-29 ENCOUNTER — OFFICE VISIT - HEALTHEAST (OUTPATIENT)
Dept: GERIATRICS | Facility: CLINIC | Age: 81
End: 2021-04-29

## 2021-04-29 ENCOUNTER — RECORDS - HEALTHEAST (OUTPATIENT)
Dept: LAB | Facility: CLINIC | Age: 81
End: 2021-04-29

## 2021-04-29 DIAGNOSIS — G89.29 CHRONIC MIDLINE LOW BACK PAIN WITH SCIATICA, SCIATICA LATERALITY UNSPECIFIED: ICD-10-CM

## 2021-04-29 DIAGNOSIS — F33.1 MODERATE EPISODE OF RECURRENT MAJOR DEPRESSIVE DISORDER (H): ICD-10-CM

## 2021-04-29 DIAGNOSIS — B02.29 POST HERPETIC NEURALGIA: ICD-10-CM

## 2021-04-29 DIAGNOSIS — M54.40 CHRONIC MIDLINE LOW BACK PAIN WITH SCIATICA, SCIATICA LATERALITY UNSPECIFIED: ICD-10-CM

## 2021-04-29 LAB
SARS-COV-2 PCR COMMENT: NORMAL
SARS-COV-2 RNA SPEC QL NAA+PROBE: NEGATIVE
SARS-COV-2 VIRUS SPECIMEN SOURCE: NORMAL

## 2021-04-29 RX ORDER — MIRTAZAPINE 7.5 MG/1
7.5 TABLET, FILM COATED ORAL AT BEDTIME
Status: SHIPPED | COMMUNITY
Start: 2021-04-29 | End: 2021-07-26

## 2021-04-29 RX ORDER — ACETAMINOPHEN 500 MG
1000 TABLET ORAL 3 TIMES DAILY
Status: SHIPPED | COMMUNITY
Start: 2021-04-29

## 2021-04-29 ASSESSMENT — MIFFLIN-ST. JEOR: SCORE: 940.32

## 2021-04-30 ENCOUNTER — RECORDS - HEALTHEAST (OUTPATIENT)
Dept: LAB | Facility: CLINIC | Age: 81
End: 2021-04-30

## 2021-05-03 LAB
ANION GAP SERPL CALCULATED.3IONS-SCNC: 7 MMOL/L (ref 5–18)
BUN SERPL-MCNC: 29 MG/DL (ref 8–28)
CALCIUM SERPL-MCNC: 9 MG/DL (ref 8.5–10.5)
CHLORIDE BLD-SCNC: 111 MMOL/L (ref 98–107)
CO2 SERPL-SCNC: 22 MMOL/L (ref 22–31)
CREAT SERPL-MCNC: 1.46 MG/DL (ref 0.6–1.1)
ERYTHROCYTE [DISTWIDTH] IN BLOOD BY AUTOMATED COUNT: 17.1 % (ref 11–14.5)
GFR SERPL CREATININE-BSD FRML MDRD: 34 ML/MIN/1.73M2
GLUCOSE BLD-MCNC: 86 MG/DL (ref 70–125)
HCT VFR BLD AUTO: 29.3 % (ref 35–47)
HGB BLD-MCNC: 9 G/DL (ref 12–16)
MAGNESIUM SERPL-MCNC: 2.2 MG/DL (ref 1.8–2.6)
MCH RBC QN AUTO: 28.3 PG (ref 27–34)
MCHC RBC AUTO-ENTMCNC: 30.7 G/DL (ref 32–36)
MCV RBC AUTO: 92 FL (ref 80–100)
PLATELET # BLD AUTO: 222 THOU/UL (ref 140–440)
POTASSIUM BLD-SCNC: 4.6 MMOL/L (ref 3.5–5)
RBC # BLD AUTO: 3.18 MILL/UL (ref 3.8–5.4)
SODIUM SERPL-SCNC: 140 MMOL/L (ref 136–145)
WBC: 6 THOU/UL (ref 4–11)

## 2021-05-05 ENCOUNTER — OFFICE VISIT - HEALTHEAST (OUTPATIENT)
Dept: GERIATRICS | Facility: CLINIC | Age: 81
End: 2021-05-05

## 2021-05-05 DIAGNOSIS — B02.29 POST HERPETIC NEURALGIA: ICD-10-CM

## 2021-05-05 DIAGNOSIS — D64.9 ANEMIA, UNSPECIFIED TYPE: ICD-10-CM

## 2021-05-05 ASSESSMENT — MIFFLIN-ST. JEOR: SCORE: 940.32

## 2021-05-06 ENCOUNTER — RECORDS - HEALTHEAST (OUTPATIENT)
Dept: LAB | Facility: CLINIC | Age: 81
End: 2021-05-06

## 2021-05-09 ENCOUNTER — RECORDS - HEALTHEAST (OUTPATIENT)
Dept: LAB | Facility: CLINIC | Age: 81
End: 2021-05-09

## 2021-05-11 LAB
ANION GAP SERPL CALCULATED.3IONS-SCNC: 7 MMOL/L (ref 5–18)
BUN SERPL-MCNC: 28 MG/DL (ref 8–28)
CALCIUM SERPL-MCNC: 9.7 MG/DL (ref 8.5–10.5)
CHLORIDE BLD-SCNC: 111 MMOL/L (ref 98–107)
CO2 SERPL-SCNC: 26 MMOL/L (ref 22–31)
CREAT SERPL-MCNC: 1.6 MG/DL (ref 0.6–1.1)
GFR SERPL CREATININE-BSD FRML MDRD: 31 ML/MIN/1.73M2
GLUCOSE BLD-MCNC: 84 MG/DL (ref 70–125)
POTASSIUM BLD-SCNC: 5 MMOL/L (ref 3.5–5)
SODIUM SERPL-SCNC: 144 MMOL/L (ref 136–145)

## 2021-05-12 ENCOUNTER — RECORDS - HEALTHEAST (OUTPATIENT)
Dept: LAB | Facility: CLINIC | Age: 81
End: 2021-05-12

## 2021-05-13 ENCOUNTER — RECORDS - HEALTHEAST (OUTPATIENT)
Dept: LAB | Facility: CLINIC | Age: 81
End: 2021-05-13

## 2021-05-13 LAB
HGB BLD-MCNC: 7.2 G/DL (ref 12–16)
SARS-COV-2 PCR COMMENT: NORMAL
SARS-COV-2 RNA SPEC QL NAA+PROBE: NEGATIVE
SARS-COV-2 VIRUS SPECIMEN SOURCE: NORMAL

## 2021-05-14 ENCOUNTER — RECORDS - HEALTHEAST (OUTPATIENT)
Dept: ADMINISTRATIVE | Facility: OTHER | Age: 81
End: 2021-05-14

## 2021-05-14 ENCOUNTER — RECORDS - HEALTHEAST (OUTPATIENT)
Dept: LAB | Facility: CLINIC | Age: 81
End: 2021-05-14

## 2021-05-14 ENCOUNTER — OFFICE VISIT - HEALTHEAST (OUTPATIENT)
Dept: GERIATRICS | Facility: CLINIC | Age: 81
End: 2021-05-14

## 2021-05-14 DIAGNOSIS — M54.40 CHRONIC MIDLINE LOW BACK PAIN WITH SCIATICA, SCIATICA LATERALITY UNSPECIFIED: ICD-10-CM

## 2021-05-14 DIAGNOSIS — R53.81 PHYSICAL DECONDITIONING: ICD-10-CM

## 2021-05-14 DIAGNOSIS — B02.29 POST HERPETIC NEURALGIA: ICD-10-CM

## 2021-05-14 DIAGNOSIS — N17.9 AKI (ACUTE KIDNEY INJURY) (H): ICD-10-CM

## 2021-05-14 DIAGNOSIS — G89.29 CHRONIC MIDLINE LOW BACK PAIN WITH SCIATICA, SCIATICA LATERALITY UNSPECIFIED: ICD-10-CM

## 2021-05-14 DIAGNOSIS — R62.7 FTT (FAILURE TO THRIVE) IN ADULT: ICD-10-CM

## 2021-05-14 DIAGNOSIS — E87.1 HYPONATREMIA: ICD-10-CM

## 2021-05-14 DIAGNOSIS — K21.9 GASTROESOPHAGEAL REFLUX DISEASE WITHOUT ESOPHAGITIS: ICD-10-CM

## 2021-05-14 DIAGNOSIS — D64.9 ANEMIA, UNSPECIFIED TYPE: ICD-10-CM

## 2021-05-14 DIAGNOSIS — F33.1 MODERATE EPISODE OF RECURRENT MAJOR DEPRESSIVE DISORDER (H): ICD-10-CM

## 2021-05-14 LAB
ANION GAP SERPL CALCULATED.3IONS-SCNC: 9 MMOL/L (ref 5–18)
BUN SERPL-MCNC: 31 MG/DL (ref 8–28)
CALCIUM SERPL-MCNC: 9.3 MG/DL (ref 8.5–10.5)
CHLORIDE BLD-SCNC: 110 MMOL/L (ref 98–107)
CO2 SERPL-SCNC: 23 MMOL/L (ref 22–31)
CREAT SERPL-MCNC: 1.48 MG/DL (ref 0.6–1.1)
GFR SERPL CREATININE-BSD FRML MDRD: 34 ML/MIN/1.73M2
GLUCOSE BLD-MCNC: 80 MG/DL (ref 70–125)
POTASSIUM BLD-SCNC: 4.8 MMOL/L (ref 3.5–5)
SODIUM SERPL-SCNC: 142 MMOL/L (ref 136–145)

## 2021-05-14 ASSESSMENT — MIFFLIN-ST. JEOR: SCORE: 988.4

## 2021-05-16 ENCOUNTER — RECORDS - HEALTHEAST (OUTPATIENT)
Dept: LAB | Facility: CLINIC | Age: 81
End: 2021-05-16

## 2021-05-16 LAB — HGB BLD-MCNC: 7.9 G/DL (ref 12–16)

## 2021-05-17 ENCOUNTER — COMMUNICATION - HEALTHEAST (OUTPATIENT)
Dept: GERIATRICS | Facility: CLINIC | Age: 81
End: 2021-05-17

## 2021-05-18 ENCOUNTER — RECORDS - HEALTHEAST (OUTPATIENT)
Dept: ADMINISTRATIVE | Facility: OTHER | Age: 81
End: 2021-05-18

## 2021-05-18 ENCOUNTER — RECORDS - HEALTHEAST (OUTPATIENT)
Dept: LAB | Facility: CLINIC | Age: 81
End: 2021-05-18

## 2021-05-18 ENCOUNTER — OFFICE VISIT - HEALTHEAST (OUTPATIENT)
Dept: GERIATRICS | Facility: CLINIC | Age: 81
End: 2021-05-18

## 2021-05-18 DIAGNOSIS — D64.9 ANEMIA, UNSPECIFIED TYPE: ICD-10-CM

## 2021-05-18 DIAGNOSIS — E87.5 HYPERKALEMIA: ICD-10-CM

## 2021-05-18 DIAGNOSIS — R60.0 EDEMA OF BOTH LOWER EXTREMITIES: ICD-10-CM

## 2021-05-18 DIAGNOSIS — R62.7 FTT (FAILURE TO THRIVE) IN ADULT: ICD-10-CM

## 2021-05-18 LAB
ALBUMIN SERPL-MCNC: 2.2 G/DL (ref 3.5–5)
ALP SERPL-CCNC: 70 U/L (ref 45–120)
ALT SERPL W P-5'-P-CCNC: 21 U/L (ref 0–45)
ANION GAP SERPL CALCULATED.3IONS-SCNC: 7 MMOL/L (ref 5–18)
AST SERPL W P-5'-P-CCNC: 16 U/L (ref 0–40)
BILIRUB SERPL-MCNC: 0.2 MG/DL (ref 0–1)
BUN SERPL-MCNC: 39 MG/DL (ref 8–28)
CALCIUM SERPL-MCNC: 9.1 MG/DL (ref 8.5–10.5)
CHLORIDE BLD-SCNC: 110 MMOL/L (ref 98–107)
CO2 SERPL-SCNC: 23 MMOL/L (ref 22–31)
CREAT SERPL-MCNC: 1.65 MG/DL (ref 0.6–1.1)
ERYTHROCYTE [DISTWIDTH] IN BLOOD BY AUTOMATED COUNT: 16.9 % (ref 11–14.5)
FOLATE SERPL-MCNC: 9.3 NG/ML
GFR SERPL CREATININE-BSD FRML MDRD: 30 ML/MIN/1.73M2
GLUCOSE BLD-MCNC: 85 MG/DL (ref 70–125)
HCT VFR BLD AUTO: 23 % (ref 35–47)
HGB BLD-MCNC: 7 G/DL (ref 12–16)
IRON SATN MFR SERPL: 9 % (ref 20–50)
IRON SERPL-MCNC: 24 UG/DL (ref 42–175)
IRON SERPL-MCNC: 24 UG/DL (ref 42–175)
MCH RBC QN AUTO: 28 PG (ref 27–34)
MCHC RBC AUTO-ENTMCNC: 30.4 G/DL (ref 32–36)
MCV RBC AUTO: 92 FL (ref 80–100)
PLATELET # BLD AUTO: 280 THOU/UL (ref 140–440)
PMV BLD AUTO: 11.6 FL (ref 8.5–12.5)
POTASSIUM BLD-SCNC: 5.2 MMOL/L (ref 3.5–5)
PROT SERPL-MCNC: 4.5 G/DL (ref 6–8)
RBC # BLD AUTO: 2.5 MILL/UL (ref 3.8–5.4)
SODIUM SERPL-SCNC: 140 MMOL/L (ref 136–145)
TIBC SERPL-MCNC: 269 UG/DL (ref 313–563)
TRANSFERRIN SERPL-MCNC: 215 MG/DL (ref 212–360)
VIT B12 SERPL-MCNC: 270 PG/ML (ref 213–816)
WBC: 3.6 THOU/UL (ref 4–11)

## 2021-05-18 ASSESSMENT — MIFFLIN-ST. JEOR: SCORE: 988.4

## 2021-05-19 ENCOUNTER — COMMUNICATION - HEALTHEAST (OUTPATIENT)
Dept: GERIATRICS | Facility: CLINIC | Age: 81
End: 2021-05-19

## 2021-05-19 ENCOUNTER — RECORDS - HEALTHEAST (OUTPATIENT)
Dept: LAB | Facility: CLINIC | Age: 81
End: 2021-05-19

## 2021-05-19 LAB — HGB BLD-MCNC: 8.2 G/DL (ref 12–16)

## 2021-05-20 ENCOUNTER — RECORDS - HEALTHEAST (OUTPATIENT)
Dept: LAB | Facility: CLINIC | Age: 81
End: 2021-05-20

## 2021-05-20 LAB
HEMOCCULT STL QL: NEGATIVE
SARS-COV-2 PCR COMMENT: NORMAL
SARS-COV-2 RNA SPEC QL NAA+PROBE: NEGATIVE
SARS-COV-2 VIRUS SPECIMEN SOURCE: NORMAL

## 2021-05-21 ENCOUNTER — OFFICE VISIT - HEALTHEAST (OUTPATIENT)
Dept: GERIATRICS | Facility: CLINIC | Age: 81
End: 2021-05-21

## 2021-05-21 DIAGNOSIS — R62.7 FTT (FAILURE TO THRIVE) IN ADULT: ICD-10-CM

## 2021-05-21 DIAGNOSIS — Z78.9 ALTERATION IN PERFORMANCE OF ACTIVITIES OF DAILY LIVING: ICD-10-CM

## 2021-05-21 DIAGNOSIS — D64.9 ANEMIA, UNSPECIFIED TYPE: ICD-10-CM

## 2021-05-21 LAB
HGB BLD-MCNC: 8 G/DL (ref 12–16)
IRON SERPL-MCNC: 19 UG/DL (ref 42–175)

## 2021-05-21 ASSESSMENT — MIFFLIN-ST. JEOR: SCORE: 988.4

## 2021-05-27 VITALS — HEIGHT: 65 IN | BODY MASS INDEX: 19.54 KG/M2 | BODY MASS INDEX: 17.77 KG/M2 | WEIGHT: 117.4 LBS

## 2021-05-27 VITALS
OXYGEN SATURATION: 97 % | SYSTOLIC BLOOD PRESSURE: 141 MMHG | HEART RATE: 98 BPM | TEMPERATURE: 97.8 F | DIASTOLIC BLOOD PRESSURE: 54 MMHG | RESPIRATION RATE: 18 BRPM

## 2021-05-27 VITALS — BODY MASS INDEX: 19.54 KG/M2 | WEIGHT: 117.4 LBS | HEIGHT: 65 IN | BODY MASS INDEX: 19.54 KG/M2

## 2021-05-27 VITALS
HEART RATE: 97 BPM | DIASTOLIC BLOOD PRESSURE: 60 MMHG | OXYGEN SATURATION: 97 % | TEMPERATURE: 97.7 F | SYSTOLIC BLOOD PRESSURE: 143 MMHG | RESPIRATION RATE: 18 BRPM

## 2021-05-28 ENCOUNTER — RECORDS - HEALTHEAST (OUTPATIENT)
Dept: ADMINISTRATIVE | Facility: CLINIC | Age: 81
End: 2021-05-28

## 2021-05-29 ENCOUNTER — RECORDS - HEALTHEAST (OUTPATIENT)
Dept: ADMINISTRATIVE | Facility: CLINIC | Age: 81
End: 2021-05-29

## 2021-05-30 ENCOUNTER — RECORDS - HEALTHEAST (OUTPATIENT)
Dept: ADMINISTRATIVE | Facility: CLINIC | Age: 81
End: 2021-05-30

## 2021-06-01 ENCOUNTER — RECORDS - HEALTHEAST (OUTPATIENT)
Dept: LAB | Facility: CLINIC | Age: 81
End: 2021-06-01

## 2021-06-01 ENCOUNTER — RECORDS - HEALTHEAST (OUTPATIENT)
Dept: ADMINISTRATIVE | Facility: CLINIC | Age: 81
End: 2021-06-01

## 2021-06-02 ENCOUNTER — RECORDS - HEALTHEAST (OUTPATIENT)
Dept: LAB | Facility: CLINIC | Age: 81
End: 2021-06-02

## 2021-06-02 LAB — HGB BLD-MCNC: 7.2 G/DL (ref 12–16)

## 2021-06-03 ENCOUNTER — RECORDS - HEALTHEAST (OUTPATIENT)
Dept: LAB | Facility: CLINIC | Age: 81
End: 2021-06-03

## 2021-06-03 LAB — HGB BLD-MCNC: 7.7 G/DL (ref 12–16)

## 2021-06-04 ENCOUNTER — OFFICE VISIT - HEALTHEAST (OUTPATIENT)
Dept: GERIATRICS | Facility: CLINIC | Age: 81
End: 2021-06-04

## 2021-06-04 DIAGNOSIS — R62.7 FTT (FAILURE TO THRIVE) IN ADULT: ICD-10-CM

## 2021-06-04 DIAGNOSIS — E44.0 MODERATE PROTEIN-CALORIE MALNUTRITION (H): ICD-10-CM

## 2021-06-04 DIAGNOSIS — D64.9 ANEMIA, UNSPECIFIED TYPE: ICD-10-CM

## 2021-06-04 LAB — HGB BLD-MCNC: 7.6 G/DL (ref 12–16)

## 2021-06-04 ASSESSMENT — MIFFLIN-ST. JEOR: SCORE: 987.5

## 2021-06-05 VITALS — BODY MASS INDEX: 19.02 KG/M2 | WEIGHT: 114.3 LBS

## 2021-06-05 VITALS — BODY MASS INDEX: 19.02 KG/M2 | HEIGHT: 65 IN

## 2021-06-07 ENCOUNTER — RECORDS - HEALTHEAST (OUTPATIENT)
Dept: LAB | Facility: CLINIC | Age: 81
End: 2021-06-07

## 2021-06-07 NOTE — PROGRESS NOTES
[facility] Admission note      Patient: Abeba Martinez  MRN: [id number]      [facility] [[id number]]  Reason for Visit     Chief Complaint   Patient presents with     H & P   Follow-up on hospitalization as well as renal failure and hypercalcemia    Code Status     Dnr/ dni    Assessment     -PEDRO in the setting of chronic kidney disease stage III.  -Symptomatic hypercalcemia.  - Profound calorie deficiency malnutrition.  BMI 17.  -Leukopenia with lymphopenia.  -History of chronic low back pain.  -History of depression  -Severe GERD  - Generalized weakness  -Chronic anemia    Plan     This is a video encounter which was done with the consent of the patient this was facilitated by a R who helped in the examination part of theN encounter as well as interview part  Patient has been admitted to the TCU.  She had presented to the hospital with ongoing weakness ongoing for 2 to 3 weeks  Work-up revealed that she had significant hypercalcemia with a calcium of 12.5 and renal failure with an admission creatinine of 2.4.  She was admitted and given hydration with improvement in labs.  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  Her additional work-up including vitamin D and serum and urine protein electrophoresis is pending.  In light of recurrent hypercalcemia as well as renal failure urgent nephrology referral for follow-up as an outpatient has been given to her.  In addition renal failure did improve with hydration she will need close monitoring in the TCU.  Her underlying etiology severe GERD for which patient has been drinking a lot of milk she was advised not to do so.  She is discharged on a higher dose of PPIs with Tums  She will see GI if her symptoms continue  Refill on her tramadol given in an emergency E K[facility] authorization given for chronic low back pain issues.  Dietary consult requested for her low BMI of 17 with  malnutrition and underweight status.  She has depressions of mood and behaviors will need to be monitored  Overall patient is feeling better but is quite upset she feels nothing much was done for her and she has been discharged she was updated that she had work-up and some of the results are pending.  She also reports that she is not been having any daiRY.  We will be watching her GERD symptoms and weights closely and she is aware of that  Medication review with her was also done including reviewing her antidepressant and insomnia medications which patient states are essential for her and requesting no taper on those  She is ambulating with a walker and discharge plan is to go home but she lives in independent living apartment.  Plan is to upgrade services to an [facility] where she can get services  Start date 4/7/2020 at 3 PM  Stop time  4/7/2020 at 3:35 PM  Location of patient is [facility] nursing [facility]  Location of MD is home office    History     Patient is a very pleasant 79 y.o. female who is admitted to TCU  Patient admitted to the hospital with acute hypercalcemia.  She was given hydration with improvement this apparently is a chronic problem with her due to ongoing excessive dairy intake.  She will have a repeat BMP done in the TCU and they have recommended cessation of dairy intake.  Unfortunately patient has underlying history of profound GERD.  They have recommended she stop using dairy to cure diet instead use Tums along with a higher dose of PPI.  She had additional work-up done including serum protein teen electrophoresis and vitamin D levels which will need to be monitored.  She also had acute renal insufficiency in the setting of chronic kidney disease stage III a renal referral was placed for her.  She will have a recheck BMP done in the TCU.  Patient has significant malnutrition and is underweight.  BMI was 17 due to malnutrition.  They recommended starting her on supplements and monitoring  intake.  She has chronic low back pain she is on Neurontin and tramadol nursing is calling because apparently hospital did not give her any scripts she is a requesting a urgent authorization for her.  Eventually she did get tramadol with improvement in her back pain  Patient also has a history of depression which will need to be monitored  Reporting mood is stable    Past Medical History     Active Ambulatory (Non-Hospital) Problems    Diagnosis     Hypercalcemia     PEDRO (acute kidney injury) (H)     Past Medical History:   Diagnosis Date     Adult failure to thrive      PEDRO (acute kidney injury) (H)      Altered mental status      Chronic low back pain      CKD (chronic kidney disease)      Elevated serum creatinine      GERD (gastroesophageal reflux disease)      Lumbar canal stenosis      Milk alkali syndrome        Past Social History     Reviewed, and she  reports that she has quit smoking. Her smoking use included cigarettes. She smoked 0.50 packs per day. She has never used smokeless tobacco. She reports current alcohol use.Lives in independent living apartment she hopes to discharge to assisted living with upgrading of services no smoking no alcohol currently    Family History     Reviewed, and includes a history of type 2 diabetes in her father and alcohol abuse.  Father also had lung cancer Brother has depression with history of suicide    Medication List   Post Discharge Medication Reconciliation Status: discharge medications reconciled and changed, per note/orders (see AVS)   acetaminophen (TYLENOL) 500 MG tablet    Sig: Take 1,000 mg by mouth every 6 (six) hours as needed for pain.    Class: Historical Med    Route: Oral    polyvinyl alcohol (LIQUIFILM TEARS) 1.4 % ophthalmic solution    Sig: Administer 1 drop to both eyes every 4 (four) hours as needed for dry eyes.    Class: Historical Med    Route: Both Eyes    FLUoxetine (PROZAC) 20 MG tablet    Sig: Take 20 mg by mouth daily.    Class: Historical  Med    Route: Oral    gabapentin (NEURONTIN) 100 MG capsule    Sig: Take 200 mg by mouth as needed.    Class: Historical Med    Route: Oral    lansoprazole (PREVACID) 30 MG capsule    Sig: Take 30 mg by mouth 2 (two) times a day.    Class: Historical Med    Route: Oral    senna-docusate (SENNOSIDES-DOCUSATE SODIUM) 8.6-50 mg tablet    Sig: Take 2 tablets by mouth every 12 (twelve) hours as needed for constipation.    Class: Historical Med    Route: Oral    simethicone (MYLICON) 80 MG chewable tablet    Sig: Chew 80 mg every 6 (six) hours as needed for flatulence.    Class: Historical Med    Route: Oral    traZODone (DESYREL) 150 MG tablet    Sig: Take 150 mg by mouth at bedtime.    Class: Historical Med    Route: Oral          Allergies     No Known Allergies    Review of Systems   A comprehensive review of 14 systems was done. Pertinent findings noted here and in history of present illness. All the rest negative.  Constitutional: Negative.  Negative for fever, chills, she has activity change, appetite change and fatigue.   HENT: Negative for congestion and facial swelling.    Eyes: Negative for photophobia, redness and visual disturbance.   Respiratory: Negative for cough and chest tightness.    Cardiovascular: Negative for chest pain, palpitations and leg swelling.   Gastrointestinal: Negative for nausea, diarrhea, constipation, blood in stool and abdominal distention.   Genitourinary: Negative.    Musculoskeletal: Negative.  Reporting profound weakness  Skin: Negative.    Neurological: Negative for dizziness, tremors, syncope, weakness, light-headedness and headaches.   Hematological: Does not bruise/bleed easily.   Psychiatric/Behavioral: Negative.        Physical Exam     Pressure 104/67 temp 98 pulse 84 WT 109LB    Constitutional: Oriented to person, place, and time and appears well-developed.  Very frail and weak  HEENT:  Normocephalic and atraumatic.  Eyes: Conjunctivae and EOM are normal. Pupils are equal,  round, and reactive to light. No discharge.  No scleral icterus. Nose normal. Mouth/Throat: Oropharynx is clear and moist. No oropharyngeal exudate.    NECK: Normal range of motion. Neck supple. No JVD present. No tracheal deviation present. No thyromegaly present.   CARDIOVASCULAR: Normal rate, regular rhythm and intact distal pulses.    No JVD noted on visual exam  PULMONARY: Effort normal and breath sounds normal. No respiratory distress.  ABDOMEN: Soft. Bowel sounds are normal. No distension and no mass.  There is no tenderness. There is no rebound and no guarding. No HSM.  MUSCULOSKELETAL: Normal range of motion. No edema and no tenderness. Mild kyphosis, no tenderness.  LYMPH NODES: Has no cervical, supraclavicular, axillary and groin adenopathy.   NEUROLOGICAL: Alert and oriented to person, place, and time. No cranial nerve deficit.  Normal muscle tone. Coordination normal.   GENITOURINARY: Deferred exam.  SKIN: Skin is warm and dry. No rash noted. No erythema. No pallor.   EXTREMITIES: No cyanosis, no clubbing, no edema. No Deformity.  PSYCHIATRIC: Normal mood, affect and behavior.      Lab Results     Her admission labs included a white count of 6 and a hemoglobin of 10.4.  Troponins were 0.01  BNP 29  TSH normal at 0.5  Admission creatinine was 2.4 baseline is 1.3  Calcium 12.5    Imaging Results     X-ray of her chest done in the hospital did show normal cardiac size and pulmonary vascularity DJD of the shoulder seen          GIANLUCA Lira

## 2021-06-07 NOTE — TELEPHONE ENCOUNTER
"[facility] Nurse Triage Telephone Note      Provider: Hailey Wilkinson MD  Facility: [facility]    Facility Type: TCU    Caller: Mili  Call Back Number:  [phone]    Allergies: Patient has no known allergies.    Reason for call: Nurse calling to get order clarification.  Patient came with orders from [facility] that said \"unreviewed meds\":  Prozac 20mg daily and Trazodone 150mg Q HS.  They didn't come on her active orders, but patient was taking them at home.  Upon further inspection in the discharge summary, the Prozac was ordered to continue at 20mg daily, but there are conflicting orders with her Trazodone.  In one area it says Trazodone 75mg Q HS PRN and another place it says 150mg Q HS scheduled.       Verbal Order/Direction given by Provider: Continue Prozac 20mg daily.  Take Trazodone 75mg Q HS scheduled.  Have NP follow up tomorrow with a virtual visit to go over medication regimen with patient and decide further on what should be done.      Provider giving order: Hailey Wilkinson MD    Verbal order given to: Bess Jackson RN      "

## 2021-06-08 LAB
ALBUMIN SERPL-MCNC: 2.5 G/DL (ref 3.5–5)
HGB BLD-MCNC: 8 G/DL (ref 12–16)
IRON SERPL-MCNC: 174 UG/DL (ref 42–175)

## 2021-06-11 ENCOUNTER — RECORDS - HEALTHEAST (OUTPATIENT)
Dept: LAB | Facility: CLINIC | Age: 81
End: 2021-06-11

## 2021-06-14 NOTE — PROGRESS NOTES
"[facility]    Facility:   [facility] [[id number]]   Code Status: FULL CODE  PCP: Nicanor Juarez MD   Phone: [phone]   Fax: [phone]      CHIEF COMPLAINT/REASON FOR VISIT:  Chief Complaint   Patient presents with     Discharge Summary       HISTORY COURSE:  Per Dr. Gao's admission visit on 1/27/2021:  Hospital Course: Patient was never hospitalized.  Rather she was seen in the emergency room on January 21 and admitted directly to this facility.    Patient presented with failure to thrive with generalized weakness.  There was concern for community-acquired pneumonia with possible left lower lobe infiltrate leading to community-acquired antibiotic treatment.    There is also a focus on her rash which has been present for months and is quite distinctive.    Provider and patient's family were concerned about her safety, and she reluctantly agreed to come to the facility here.    Subjective/ROS:    -augmented by discussion with facility staff involved in direct care      -Patient regrets her decision to come to the facility.  She does not like it here.  She wants to leave and go home as soon as possible.  -Patient's distinctive rash started probably in the late fall or early winter.  She thinks at least 2 months ago.  She described getting \"little bumps\" which were very itchy.  She scratched them and there was some weeping.  She says it never really hurt but was just very very itchy.  This is led to a lot of scratching and the rash is never gone away.  She says that now she has some discomfort with the rash but nothing severe or that she would seek attention for.  No known history of zoster vaccination.  -Hyponatremia noted and worsening.  She has been on tramadol but for at least a year.  She generally takes 1 or 2 a day.  She knows she can have up to 3 a day.  -She says she takes the tramadol for low back pain.  She says she has disc disease L3-4-5.  She has some " radiation into the upper part of her right leg when she is up and about.  She has had epidural injections in the past though quite a while ago.  She does have a history with [facility] but she is not sure she is going to keep it if she is decided against surgery.  -Patient's weight has been stable over the last year or so.  Patient reports no headaches change in vision speaking swallowing hearing nausea vomiting diarrhea melena bright red blood per rectum dysuria fever sweats chills.  She did lose her strength in the bathroom here last night but did not fall to the floor.  She was able to lower herself onto the toilet.  She denies recent falls at home as well.  No dysuria.  She describes her mood is good.  Remainder negative    Today's visit:  Abeba is an 80y.o. woman  has a past medical history of Adult failure to thrive, PEDRO (acute kidney injury) (H), Altered mental status, Chronic low back pain, CKD (chronic kidney disease), Elevated serum creatinine, GERD (gastroesophageal reflux disease), Lumbar canal stenosis, and Milk alkali syndrome. Patient seen today for discharge visit in TCU. She is to discharge home on 2/6/2021 with PT from [facility]. Patient notes she is looking forward to discharge home. She is concerned about pain control at home - she notes tramadol does work, but her PCP does not want her to be on this - she states he had previously mentioned treatment of her pain with Gabapentin - we discuss this more about the difference between opioids and neuropathic pain medications - she would like to try Gabapentin prior to discharge as this would allow her to assess efficacy and safety prior to going home - then be able to follow-up with Dr Hamilton for adjustments as needed. Therapies are going well - she is going to discharge with ongoing therapies. Appetite continues to improve - she is ready for food at home. She is sleeping well. Denies CP, palpitations, fatigue, nausea, vomiting,  increased SOB/GONZALES, fever, chills, and/or b/b concerns today.    PHYSICAL EXAM:   GENERAL APPEARANCE:  Alert, in no distress, appears healthy, oriented, thin, cooperative, elderly woman in room  EYES:  EOM, conjunctivae, lids, pupils and irises normal  NECK:  No adenopathy,masses or thyromegaly  RESP:  respiratory effort and palpation of chest normal, lungs clear to auscultation , no respiratory distress  CV:  Palpation and auscultation of heart done , regular rate and rhythm, no murmur, rub, or gallop, no edema, +2 pedal pulses  ABDOMEN:  normal bowel sounds, soft, nontender, no hepatosplenomegaly or other masses  M/S:   Gait and station abnormal - uses RW  Digits and nails abnormal - arthritic changes present  SKIN:  Palpation of skin and subcutaneous tissue baseline, Inspection of skin and subcutaneous tissue abnormal, rash present, type: multiple areas of rash with wounds of varying degress of healing - no new areas or advancement noted, appearance: red, location: left, lateral neck  NEURO:   Cranial nerves 2-12 are normal tested and grossly at patient's baseline  PSYCH:  oriented X 3, normal insight, judgement and memory, affect and mood normal, anxious    MEDICATION LIST:  Current Outpatient Medications   Medication Sig     famotidine (PEPCID) 40 MG tablet Take 40 mg by mouth daily.     lansoprazole (PREVACID) 30 MG capsule Take 30 mg by mouth 2 (two) times a day.     traMADoL (ULTRAM) 50 mg tablet Take 50 mg by mouth every 6 (six) hours as needed for pain.     traZODone (DESYREL) 150 MG tablet Take 150 mg by mouth at bedtime.      triamcinolone (KENALOG) 0.1 % cream Apply topically 2 (two) times a day.     Labs:  Reviewed in EPIC    DISCHARGE DIAGNOSIS:  Failure to thrive  Generalized weakness  Etiology is unclear.  Pneumonia has been suspected and she has received treatment for it.  It sounds like the pandemic has not done her any good with isolation and decreased activity. She is looking forward to returning  home with ongoing therapies for strengthening.   -Follow-up with PCP for ongoing monitoring, management and future interventions as needed.   -PT/OT through home care - adv per their recommendations      Community-acquired pneumonia  Has completed her antibiotic therapy. No respiratory symptoms at this time. No additional work-up or treatment anticipated.  -Follow-up with PCP for ongoing monitoring    GERD  Patient reports that she was hospitalized for this a year ago and has been on PPI since without any recurrent problems.  She also takes Pepcid.  Unclear if she needs both lifelong but no changes made while in TCU.   -Continue medications as ordered  -Follow-up with PCP for ongoing monitoring and management    Rash  Suspected herpes zoster without complication  The most distinctive feature is its completely unilateral distribution.  Another distinctive feature is relatively sharp smooth demarcation especially at the upper margin from the tip of the chin to the lobe of the ear.  The next feature is heavy excoriation including multiple scabs of all sorts of different sizes and shapes.  The next feature is lichenification especially in the anterior shoulder and on top of the shoulder.  There is very mild erythema noted - but no ongoing concern for secondary infection. Did start on regimen of Acyclovir for concerns that this was advanced Shingles - no furthering of rash noted, mild improvement with daily wound care; resident does note associated itching and headaches with rash - feels gabapentin may help this as well as discussed above.  -Follow-up with dermatology scheduled for 3/11/2021  -Continues on Valacyclovir at this time; effectiveness to be determined by PCP with follow-up visit      Chronic opiate dependent pain  Lumbar foramanal stenosis  Chronic LBP with sciatica  Patient notes LBP is quite chronic and has not changed.  Discussion re:Tramadol and her concerns following discharge as noted above, she does  not feel her PCP will continue this for her and she would prefer a medication he would be okay with.   -Continue PRN Tramadol, will discharge with limited prescription  -Start Gabapentin 100mg PO at bedtime - assess for ability to increase with resident safety prior to discharge.  -Follow-up with PCP for ongoing monitoring and management.    Hyponatremia  She has a history of normal sodiums until January 21 in the emergency room at 133.  She is now down to 131. Her creatinine has increased from 1.66-1.83. Thought to be  hypovolemic hyponatremia is a strong consideration especially with her worsening renal function; Development of SIADH is always a consideration as well. Urine sodium osmolarity completed in TCU WNL. Consider tramadol suspect but just seems unlikely since he has been on it for a year.  -Ongoing monitoring and management per PCP    CKD 3  Appears to be the case based on prior creatinine readings.  Indeed she is even been into the CKD 4 range at times although that appears to be with hospitalization, and more likely reflects some acute kidney injury at that time in April 2020.  -Follow-up with PCP for ongoing monitoring.    Insomnia  Takes trazodone 150 mg at bedtime  -Continue as ordered  -Follow-up with PCP for ongoing monitoring and management; may need reduction with initiation of Gabapentin.    Anemia  Noted on today's lab work with a drop if her Hgb from 8.4 on 1/27/2021 to 7.4 today. No acute s/sx of bleeding. Patient denies presence of lightheadedness, increased fatigue, dizziness.   -Protonix 20mg PO two times a day  -Guaiac stools x3  -Hgb 2/3/2021 for recheck  -Have asked nursing to schedule follow-up visit with her PCP to ensure appropriate follow-up and monitoring of this versus holding residents discharge with her being hemodynamically stable.      MEDICAL EQUIPMENT NEEDS:  None    DISCHARGE PLAN/FACE TO FACE:  I certify that services are/were furnished while this patient was under the  care of a physician and that a physician or an allowed non-physician practitioner (NPP), had a face-to-face encounter that meets the physician face-to-face encounter requirements. The encounter was in whole, or in part, related to the primary reason for home health. The patient is confined to his/her home and needs intermittent skilled nursing, physical therapy, speech-language pathology, or the continued need for occupational therapy. A plan of care has been established by a physician and is periodically reviewed by a physician.  Date of Face-to-Face Encounter: 02/02/2021    My clinical findings support the need for the above services because: Physical Therapy Services are needed to assess and treat the following functional impairments: physical limitation 2/2 above noted diagnoses.    Further, I certify that my clinical findings support that this patient is homebound (i.e. absences from home require considerable and taxing effort and are for medical reasons or [employer] services or infrequently or of short duration when for other reasons) because: Is unable to walk greater than 60 feet without rest.      The patient is, or has been, under my care and I have initiated the establishment of the plan of care. This patient will be followed by a physician who will periodically review the plan of care.    Schedule follow up visit with primary care provider within 7 days to reestablish care.    Electronically signed by:   LEONARDO Woodson, [facility]  [place] Office: [address] [place] [address]   [place] Cell: [phone]  [place] Fax: [phone]    [facility] Offce: [address] [place] [address]  [facility] Phone: [phone]  [facility] Voicemail: [phone]

## 2021-06-15 LAB
HGB BLD-MCNC: 8.4 G/DL (ref 12–16)
IRON SERPL-MCNC: 66 UG/DL (ref 42–175)

## 2021-06-15 ASSESSMENT — MIFFLIN-ST. JEOR: SCORE: 955.38

## 2021-06-15 NOTE — PROGRESS NOTES
"[facility]/[place] [facility] telehealth visit        Video Visit        Abeba Martinez is a 80 y.o. female who is being evaluated via a billable video visit.      The patient has been notified of following:     \"This video visit will be conducted via a call between you and your physician/provider. We have found that certain health care needs can be provided without the need for an in-person physical exam.  This service lets us provide the care you need with a video conversation.  If a prescription is necessary we can send it to the facility team.  If lab work is needed we can place an order through the facility team to have that test done at a later time.    If during the course of the call the physician/provider feels a video visit is not appropriate, you will not be charged for this service.\"     Physician/Provider has received verbal consent for a Video Visit from the patient/family/facility staff on 2/3/2021    HPI statement: Abeba Martinez is a 80 y.o. female who is being evaluated via a billable video visit by Brown Gao MD from home office.  Resident was seen at the residence with facility staff.        Video-Visit Details    Type of service:  Video Visit    Video Start Time: 11:50 AM    Video End Time (time video stopped): 12:04 PM    Originating Location (pt. Location):[facility] [[id number]]    Distant Location (provider location):  [facility]     Mode of Communication:  facetime Video Conference            Time of visit:    Patient/POA/facility staff gave verbal consent to receive medical care via telemedicine on 2/3/2021.      [facility] for [facility]iors/ Geriatrics    Facility:  [facility] [[id number]]    Code Status:  DNR/DNI    No chief complaint on file.  :                    Patient Active Problem List   Diagnosis     Hypercalcemia     PEDRO (acute kidney injury) (H)     Constipation     unilateral rash     FTT " (failure to thrive) in adult     Anemia       History:  Abeba Martinez  is an 80 year old female with history of CKD 3, GERD, milk-alkali syndrome, lumbosacral spine disease, chronic opiate dependent pain seen for admission to TCU     Hospital Course: Patient was never hospitalized.  Rather she was seen in the emergency room on January 21 and admitted directly to this facility.    Patient presented with failure to thrive with generalized weakness.  There was concern for community-acquired pneumonia with possible left lower lobe infiltrate leading to community-acquired antibiotic treatment.    There is also a focus on her rash which has been present for months and is quite distinctive.    Provider and patient's family were concerned about her safety, and she reluctantly agreed to come to the facility here.    Subjective/ROS:    -augmented by discussion with facility staff involved in direct care    --Patient is leaving the facility this Saturday and is excited about discharge.    -New problem has come up which is anemia.  While she appears chronically anemic she had hemoglobin 9.4 on January 26, 8.4 on January 28 and 7.0 in February 2.  The drop is unexplained at this point.  There is been no evidence of bleeding and she has no new symptoms of anemia.  She says that she is always felt a little lightheaded on occasion when she gets up and moves around but has not been consistent and never had any fainting or anything close to it.  She says she is not having chest pain and her breathing feels as usual.  Is been no melena bright red blood per rectum dysuria.  No new falls or injuries.  She has no pain elsewhere.  She denies change in vision speaking swallowing hearing.  She is not constipated.  She does not have diarrhea.  She has no dysuria fever.  No fever sweats or chills.    -Her skin rash has been cleaned up a bit but it is still itchy.  She says that during the day she can usually avoid itching it but she will  wake up at night and find that she has been scratching at it.    -Gabapentin has been started for the tingling itchy sensation she has in the area.  Just 100 mg at bedtime so far.    -She has been on valacyclovir as per Ms. Moore's note for several days.  She has tolerated it well.  Unknown if any effect on the rash.  Most of the visual changes appear new to cleaning the area well to get off the old dead skin etc.    -Patient reasserts her believe that the rash has been present for months not weeks.  The area of rash has not increased.  She has had no blistering lesions    -Patient believes she is never had EGD and I find none on the record.  Recall that she was hospitalized in the spring 2020 and complained of substernal chest pains and was started on PPI at that time.  She is continued on PPI ever since.  She also takes Pepcid chronically at at bedtime.  She feels like those treatments have improved her chest pain although she occasionally still has it.  She says it is especially present if she has coffee.  She says it has not been bothering her lately.  Last colonoscopy is unknown    -No known history of zoster vaccination.  -Hyponatremia has resolved, likely hypovolemic.  She had a low urine sodium less than 20.    -Longstanding tramadol, greater than a year.  She says she has disc disease L3-4-5.  She has some radiation into the upper part of her right leg when she is up and about.  She has had epidural injections in the past though quite a while ago.  She does have a history with [facility] but she is not sure she is going to keep it if she is decided against surgery.  -Remainder negative    Past Medical History:   Diagnosis Date     Adult failure to thrive      PEDRO (acute kidney injury) (H)      Altered mental status      Chronic low back pain      CKD (chronic kidney disease)      Elevated serum creatinine      GERD (gastroesophageal reflux disease)      Lumbar canal stenosis      Milk alkali syndrome       Past Surgical History:   Procedure Laterality Date     APPENDECTOMY       CATARACT EXTRACTION, BILATERAL       VEIN LIGATION AND STRIPPING            Family History   Problem Relation Age of Onset     Alcohol abuse Father      Lung cancer Father      Diabetes Father      Depression Brother         suicide   :       Social History     Socioeconomic History     Marital status:      Spouse name: Not on file     Number of children: Not on file     Years of education: Not on file     Highest education level: Not on file   Occupational History     Not on file   Social Needs     Financial resource strain: Not on file     Food insecurity     Worry: Not on file     Inability: Not on file     Transportation needs     Medical: Not on file     Non-medical: Not on file   Tobacco Use     Smoking status: Former Smoker     Packs/day: 0.50     Types: Cigarettes     Smokeless tobacco: Never Used   Substance and Sexual Activity     Alcohol use: Yes     Drug use: Not on file     Sexual activity: Not on file   Lifestyle     Physical activity     Days per week: Not on file     Minutes per session: Not on file     Stress: Not on file   Relationships     Social connections     Talks on phone: Not on file     Gets together: Not on file     Attends [employer]ism service: Not on file     Active member of club or organization: Not on file     Attends meetings of clubs or organizations: Not on file     Relationship status: Not on file     Intimate partner violence     Fear of current or ex partner: Not on file     Emotionally abused: Not on file     Physically abused: Not on file     Forced sexual activity: Not on file   Other Topics Concern     Not on file   Social History Narrative     Not on file   :    Patient lives in [facility].  She says she has a son that lives within a few blocks of her.  She has 3 other children.  She says that she no longer drives but she knows how to order her food which is delivered to her house.   She says she used to go out walking downtown but with the pandemic is now been close to a year since she has been able to do that.    Current Outpatient Medications on File Prior to Visit   Medication Sig Dispense Refill     famotidine (PEPCID) 40 MG tablet Take 40 mg by mouth daily.       gabapentin (NEURONTIN) 100 MG capsule Take 100 mg by mouth 3 (three) times a day.       lansoprazole (PREVACID) 30 MG capsule Take 30 mg by mouth 2 (two) times a day.       traMADoL (ULTRAM) 50 mg tablet Take 1 tablet (50 mg total) by mouth every 6 (six) hours as needed for pain. 16 tablet 0     traZODone (DESYREL) 150 MG tablet Take 150 mg by mouth at bedtime.        triamcinolone (KENALOG) 0.1 % cream Apply topically 2 (two) times a day.       valACYclovir (VALTREX) 1000 MG tablet Take 1,000 mg by mouth daily.       No current facility-administered medications on file prior to visit.    :      ALLERGIES:  Patient has no known allergies.    Vitals:      Vitals:    02/03/21 1330   BP: 137/62   Pulse: 82   Resp: 16   Temp: 97.7  F (36.5  C)   SpO2: 95%     Weight is 114.3 pounds.    Physical exam: Observational exam only    Patient is alert oriented x3 and remembers me from last week.  She is calm appears comfortable and is breathing easily without tachypnea or accessory muscle use.  Her speech is clear and fluent.  She answers questions easily.  She follows commands easily.  Her gaze is conjugate sclera clear.    Rash appears to have the identical distribution unilateral see 3 4 possibly 5.  Much of the dead tissue/desquamating skin has been removed so looks a bit .  Many scabbed lesions are unchanged in appearance.  They are large and do not show any evidence of prior blister roofs etc.  No new lesions noted    Purposeful movement of all 4 extremities noted.        Due to the 2020 Covid 19 pandemic, except as noted above, the patient was visually observed at a 6 foot plus distance.  An observational exam was performed in  an effort to keep patient safe from Covid 19 and other communicable diseases.   Labs:  Lab Results   Component Value Date    WBC 6.8 02/02/2021    HGB 7.1 (L) 02/03/2021    HCT 22.3 (L) 02/02/2021    MCV 82 02/02/2021     02/02/2021     Results for orders placed or performed in visit on 02/02/21   Basic Metabolic Panel   Result Value Ref Range    Sodium 138 136 - 145 mmol/L    Potassium 4.4 3.5 - 5.0 mmol/L    Chloride 108 (H) 98 - 107 mmol/L    CO2 22 22 - 31 mmol/L    Anion Gap, Calculation 8 5 - 18 mmol/L    Glucose 102 70 - 125 mg/dL    Calcium 8.9 8.5 - 10.5 mg/dL    BUN 26 8 - 28 mg/dL    Creatinine 1.49 (H) 0.60 - 1.10 mg/dL    GFR MDRD Af Amer 41 (L) >60 mL/min/1.73m2    GFR MDRD Non Af Amer 34 (L) >60 mL/min/1.73m2         Lab Results   Component Value Date    TSH 1.09 08/26/2019     No results found for: HGBA1C  [unfilled]  Lab Results   Component Value Date    YAMTAVFQ16 361 08/26/2019     No results found for: BNP  [unfilled]        Invalid input(s): PRINTERVAL       Complete Blood Count -no Diff (01/21/2021 11:44 AM CST)  Complete Blood Count -no Diff (01/21/2021 11:44 AM CST)   Component Value Ref Range Performed At Pathologist Signature   WBC 5.5 3.5 - 10.5 x10(9)/L [facility]     RBC 3.62 (L) 3.90 - 5.03 x10(12)/L [facility]     Hemoglobin 9.5 (L) 12.0 - 15.5 g/dL [facility]     HCT 29.8 (L) 34.9 - 44.5 % [facility]     MCV 82.3 80.0 - 100.0 fL [facility]     MCH 26.2 (L) 27.6 - 33.3 pg [facility]     MCHC 31.9 31.5 - 35.2 g/dL [facility]     RDW 15.9 (H) 11.9 - 15.5 % [facility]     Platelets 189 150 - 450 x10(9)/L [facility]     Automated NRBC 0 <=0 /100 WBC [facility]       Complete Blood Count -no Diff (01/21/2021 11:44 AM CST)   Specimen   Blood     Complete Blood Count -no Diff (01/21/2021 11:44 AM CST)   Performing Organization Address City/State/ZIP Code Phone Number   [facility]   [address] [place] [address]    [phone]     Back to top of Lab Results       Basic Metabolic Panel (01/21/2021 11:44 AM CST)  Basic Metabolic Panel (01/21/2021 11:44 AM CST)   Component Value Ref Range Performed At Pathologist Signature   Sodium 133 (L) 136 - 145 mmol/L [facility]     Potassium 4.3 3.5 - 5.1 mmol/L [facility]     Chloride 99 98 - 109 mmol/L [facility]     CO2 25 20 - 29 mmol/L [facility]     Anion Gap 9 7 - 16 mmol/L [facility]     Calcium 11.1 (H) 8.4 - 10.4 mg/dL [facility]     BUN 37 (H) 7 - 26 mg/dL [facility]     Creatinine 1.66 (H) 0.55 - 1.02 mg/dL [facility]     GFR, Estimated 28 (L) >60 mL/min/1.73m2 [facility]     Glucose 111 (H)Comment: The given reference range is for the fasting state. Non-fasting reference range for glucose is 70 - 180 mg/dL. 70 - 100 mg/dL [facility]        Assessment/Plan:      ICD-10-CM    1. unilateral rash  R21    2. Anemia, unspecified type  D64.9      Anemia    Acute on chronic.  She was 9.4 on the last day of hospitalization January 26.  She had an abrupt dropped down to 7 yesterday, 7.1 today without any apparent bleeding.  I am not able to explain this drop with any degree of certainty.  Up-to-date does list decreased hemoglobin has had restrictions in the percent of the time.  However her hemoglobin was falling even before she started it down to 8.4 on January 28.  -FOBT has been ordered but she has not had a stool, certainly no clinically evident bleeding has been present  -Patient has no new symptoms though she does have some chronic intermittent lightheadedness which sounds orthostatic in nature  -She is on long-term PPI since spring 2020 admission but has never had EGD.  She also takes Pepcid at night.  Dr. Torres has changed her over to lansoprazole from omeprazole.  -It is unclear that valacyclovir has been helpful to the patient.  Unclear if it is contributing to her anemia at all.  Weighing the risk-benefit, I would be  "in favor of discontinuation a couple of days early, especially as she is discharging soon  -We will check hemoglobin 1 more time before discharge on Friday.  We will also do some anemia labs to get the work-up started so she is got a little bit of a head start when she sees her primary MD in follow-up.      failure to thrive  Generalized weakness   Etiology is unclear.  Pneumonia has been suspected and she has completed treatment for it.  It sounds like the pandemic has not done her any good with isolation and decreased activity.  -PT, OT,  involvement.  Dietitian consulted.  Cognitive assessment completed.  Patient has been approved for return to independent living.    Community-acquired pneumonia   Has completed her antibiotic therapy.  \".   Result Narrative   EXAM: XR PORTABLE CHEST 1 VIEW  LOCATION: [facility]  DATE/TIME: 1/21/2021 12:35 PM    INDICATION: Generalized weakness  COMPARISON: 04/04/2020    IMPRESSION: Mild nasal predominant interstitial infiltrate. Stable biapical scarring. Heart size is normal. No pleural effusions. Atherosclerotic change of aorta. Marked arthritic change in both shoulders.   Other Result Information   Interface, In Rad Results - 01/21/2021 12:46 PM CST  EXAM: XR PORTABLE CHEST 1 VIEW  LOCATION: [facility]  DATE/TIME: 1/21/2021 12:35 PM    INDICATION: Generalized weakness  COMPARISON: 04/04/2020    IMPRESSION: Mild nasal predominant interstitial infiltrate. Stable biapical scarring. Heart size is normal. No pleural effusions. Atherosclerotic change of aorta. Marked arthritic change in both shoulders.     -No new respiratory symptoms at this time.  Anticipate discharge in the next few days.    GERD   Patient reports that she was hospitalized for this a year ago and has been on PPI since without any recurrent problems.  She also takes Pepcid.  Unclear if she needs both lifelong but I did not make any changes today.    Rash   Not much new to report today.  " Rash is basically unchanged except for cleaning up the dad and desquamated skin.  No new lesions.  I will say that Ms. Moore's conversation with patient's daughter cast some doubt on the true time course here.  However patient is adamant that it has been present for months rather than weeks.  There is no sign of secondary bacterial infection at this time.    From my previous note:   The most distinctive feature is its completely unilateral distribution.  Another distinctive feature is relatively sharp smooth demarcation especially at the upper margin from the tip of the chin to the lobe of the ear.  The next feature is heavy excoriation including multiple scabs of all sorts of different sizes and shapes.  The next feature is lichenification especially in the anterior shoulder and on top of the shoulder.  There is very mild erythema here but I do not think we are dealing with secondary bacterial infection that we need to watch for that    Patient continues to have a tingling itching sensation which could be a variation of postherpetic neuralgia.    Despite the unusual story, I think remote shingles with postherpetic neuralgia and unrelenting itch scratch cycle is a distinct possibility here.  When she describes the early part of the rash it does sound like she was dealing with small blisters that did weep.  It is in adjacent dermatomes C3-4 and probably 5 which is not rare though atypical.    Other unusual unilateral rashes exist but seem less likely based on what we can see and hear about the original presentation.  -Have asked the staff to try to move her dermatology appointment up from the March 8 date.  Unfortunately it has moved the other way back to March 11.  -No expected benefit to antiviral therapy at this point..  Again with the new information casting some doubt on the chronicity here, Valtrex may have been worth a try.  However at this point I would like to discontinue it.  -Postherpetic neuralgia may  be the cause of her itchiness and tingling sensation.  Gabapentin has been started at low-dose 100 at bedtime.    -Continue topical steroid and Bactroban therapy as current  -Pepcid is left in place to help with the itch scratch cycle.  -Outpatient follow-up with primary MD will precede dermatology appointment.    Chronic opiate dependent pain  Lumbosacral spine disease 3-4-5   I do not have primary documentation about the lumbar disease.  She says it is quite chronic and has not changed.  She feels she really needs to stay on the tramadol .  She will take it up with it primary MD following discharge.    Hyponatremia   Resolved.  Looks hypovolemic in nature.  Urine sodium less than 20.  Creatinine also a bit better with better oral hydration.  Looks like tramadol was not playing a role.    CKD 3   Appears to be the case based on prior creatinine readings.  Indeed she has even been into the CKD 4 range at times although that appears to be with prior hospitalization, and more likely reflects some acute kidney injury at that time in April 2020.        Insomnia   Takes trazodone 150 mg at bedtime            Case discussed with:    Facility staff             Brown Gao MD

## 2021-06-15 NOTE — PROGRESS NOTES
[facility]    Facility:   [facility] [[id number]]   Code Status: POLST AVAILABLE      CHIEF COMPLAINT/REASON FOR VISIT:  Chief Complaint   Patient presents with     Hospital Visit Follow Up       HISTORY:      HPI: Abeba is a 80 y.o. female history of CKD, GERD, low back pain and opioid dependence who was seen in the emergency room with failure to thrive and generalized weakness.  She was thought to have had a community-acquired pneumonia and is being treated for that although she says that she does not have pneumonia.  She typically lives at home in her own apartment and her daughter stops by every couple weeks to help with showering and groceries.  She is limited in her history and does not want to engage much in conversation, rest of her history is obtained through chart review which is also minimal if she did not have a hospitalization and was only seen in the ER at [facility].    Today she is seen sitting up on the side of the bed, she is able to put on her own clothes well and sitting in the room with her.  She is quite capable with her ADLs but does remain weak.  Labs today with hemoglobin 9.4 and sodium 131.  Magnesium stable.  Appetite has been poor and she is working on increasing p.o. intake and fluids.  She is otherwise denying any pain currently, cough, fevers, chest pain, abdominal pain, diarrhea, constipation.    Past Medical History:   Diagnosis Date     Adult failure to thrive      PEDRO (acute kidney injury) (H)      Altered mental status      Chronic low back pain      CKD (chronic kidney disease)      Elevated serum creatinine      GERD (gastroesophageal reflux disease)      Lumbar canal stenosis      Milk alkali syndrome              Family History   Problem Relation Age of Onset     Alcohol abuse Father      Lung cancer Father      Diabetes Father      Depression Brother         suicide     Social History     Socioeconomic History     Marital status:  "     Spouse name: Not on file     Number of children: Not on file     Years of education: Not on file     Highest education level: Not on file   Occupational History     Not on file   Social Needs     Financial resource strain: Not on file     Food insecurity     Worry: Not on file     Inability: Not on file     Transportation needs     Medical: Not on file     Non-medical: Not on file   Tobacco Use     Smoking status: Former Smoker     Packs/day: 0.50     Types: Cigarettes     Smokeless tobacco: Never Used   Substance and Sexual Activity     Alcohol use: Yes     Drug use: Not on file     Sexual activity: Not on file   Lifestyle     Physical activity     Days per week: Not on file     Minutes per session: Not on file     Stress: Not on file   Relationships     Social connections     Talks on phone: Not on file     Gets together: Not on file     Attends Buddhism service: Not on file     Active member of club or organization: Not on file     Attends meetings of clubs or organizations: Not on file     Relationship status: Not on file     Intimate partner violence     Fear of current or ex partner: Not on file     Emotionally abused: Not on file     Physically abused: Not on file     Forced sexual activity: Not on file   Other Topics Concern     Not on file   Social History Narrative     Not on file         Review of Systems   Constitutional: Negative.    HENT: Negative.    Eyes: Negative.    Respiratory: Negative.    Cardiovascular: Negative.    Endocrine: Negative.    Genitourinary: Negative.    Musculoskeletal: Positive for back pain.   Neurological: Positive for weakness. Negative for dizziness and headaches.   Hematological: Negative.    Psychiatric/Behavioral: Positive for agitation. Negative for behavioral problems.       Vitals:    01/25/21 1514   BP: 148/57   Pulse: (!) 103   Resp: 18   Temp: 97.1  F (36.2  C)   SpO2: 93%   Weight: 112 lb 6.4 oz (51 kg)   Height: 5' 5\" (1.651 m)       Physical " Exam  Constitutional:       Appearance: Normal appearance. She is not ill-appearing.   HENT:      Head: Atraumatic.   Eyes:      General: No scleral icterus.  Cardiovascular:      Rate and Rhythm: Normal rate and regular rhythm.   Pulmonary:      Effort: Pulmonary effort is normal.      Breath sounds: Normal breath sounds.   Abdominal:      General: There is no distension.      Palpations: Abdomen is soft.      Tenderness: There is no abdominal tenderness.   Musculoskeletal: Normal range of motion.         General: No swelling.   Skin:     Comments: Red, dry pustualar rash R neck   Neurological:      General: No focal deficit present.      Mental Status: She is alert and oriented to person, place, and time.      Motor: Weakness present.      Gait: Gait normal.           LABS:   Reviewed.     ASSESSMENT:      ICD-10-CM    1. FTT (failure to thrive) in adult  R62.7    2. unilateral rash  R21    3. PEDRO (acute kidney injury) (H)  N17.9        PLAN:      Failure to thrive  PEDRO: I do think she has some hypovolemia and poor p.o. intake, will monitor her labs and recheck on Thursday to monitor kidney function.  Encourage good p.o. intake.  She is asking about discharge today however I discussed this with staff who says they have spoken with her daughter who believes she has some cognitive impairment and she does not want her discharging right away without some more therapies.  At this time we will continue with the current plan for therapies and monitor for improvement in renal function and p.o. intake.  -BMP on Thursday.    GERD: Denies any active symptoms.  Takes famotidine.    Low back pain and lumbosacral disease: Continues on tramadol which we can monitor use of.      Electronically signed by: Yareli Moore CNP

## 2021-06-15 NOTE — PROGRESS NOTES
[facility]    Facility:   [facility] [[id number]]   Code Status: POLST AVAILABLE      CHIEF COMPLAINT/REASON FOR VISIT:  Chief Complaint   Patient presents with     Problem Visit     hyponatremia, rash        HISTORY:      HPI: Abeba is a 80 y.o. female history of CKD, GERD, low back pain and opioid dependence who was seen in the emergency room with failure to thrive and generalized weakness.  She was thought to have had a community-acquired pneumonia and is being treated for that although she says that she does not have pneumonia.  She typically lives at home in her own apartment and her daughter stops by every couple weeks to help with showering and groceries.  She is limited in her history and does not want to engage much in conversation, rest of her history is obtained through chart review which is also minimal if she did not have a hospitalization and was only seen in the ER at [facility].    Today: Seen today again at nursing request and to follow-up on labs and rash.  Rash is more extensive to the face and shoulder with dried crusting vesicles.  It is more involved than was 2 days ago.  I spoke with her daughter who she brought her to the ER and she had washed her mom's hair that evening prior to bring her in and did not see any rash like.  Given this new information, suspicion for herpes zoster is higher.  Does states she had a rash a couple months ago but it was not anything has been now acting up to her R face and cheek.  Given that the patient has multiple comorbidities admitted to mental rash, will start renally dosed valacyclovir.  Also reviewed blood work-up for SIADH given her mildly low sodium which is improved today at 133. Urine sodium <20.  Eating drinking better and is more engaged with conversation, offering me more information during exam.     Past Medical History:   Diagnosis Date     Adult failure to thrive      PEDRO (acute kidney injury) (H)       Altered mental status      Chronic low back pain      CKD (chronic kidney disease)      Elevated serum creatinine      GERD (gastroesophageal reflux disease)      Lumbar canal stenosis      Milk alkali syndrome              Family History   Problem Relation Age of Onset     Alcohol abuse Father      Lung cancer Father      Diabetes Father      Depression Brother         suicide     Social History     Socioeconomic History     Marital status:      Spouse name: Not on file     Number of children: Not on file     Years of education: Not on file     Highest education level: Not on file   Occupational History     Not on file   Social Needs     Financial resource strain: Not on file     Food insecurity     Worry: Not on file     Inability: Not on file     Transportation needs     Medical: Not on file     Non-medical: Not on file   Tobacco Use     Smoking status: Former Smoker     Packs/day: 0.50     Types: Cigarettes     Smokeless tobacco: Never Used   Substance and Sexual Activity     Alcohol use: Yes     Drug use: Not on file     Sexual activity: Not on file   Lifestyle     Physical activity     Days per week: Not on file     Minutes per session: Not on file     Stress: Not on file   Relationships     Social connections     Talks on phone: Not on file     Gets together: Not on file     Attends [employer] service: Not on file     Active member of club or organization: Not on file     Attends meetings of clubs or organizations: Not on file     Relationship status: Not on file     Intimate partner violence     Fear of current or ex partner: Not on file     Emotionally abused: Not on file     Physically abused: Not on file     Forced sexual activity: Not on file   Other Topics Concern     Not on file   Social History Narrative     Not on file         Review of Systems   Constitutional: Negative.    HENT: Negative.    Eyes: Negative.    Respiratory: Negative.    Cardiovascular: Negative.    Endocrine: Negative.   "  Genitourinary: Negative.    Musculoskeletal: Positive for back pain.   Skin: Positive for rash.   Neurological: Positive for weakness. Negative for dizziness and headaches.   Hematological: Negative.    Psychiatric/Behavioral: Positive for agitation. Negative for behavioral problems.       Vitals:    01/28/21 1326   BP: 124/54   Pulse: 95   Resp: 16   Temp: 98.8  F (37.1  C)   SpO2: 96%   Weight: 102 lb 6.4 oz (46.4 kg)   Height: 5' 5\" (1.651 m)       Physical Exam  Constitutional:       Appearance: Normal appearance. She is not ill-appearing.   HENT:      Head: Atraumatic.   Eyes:      General: No scleral icterus.  Cardiovascular:      Rate and Rhythm: Normal rate and regular rhythm.   Pulmonary:      Effort: Pulmonary effort is normal.      Breath sounds: Normal breath sounds.   Abdominal:      General: There is no distension.      Palpations: Abdomen is soft.      Tenderness: There is no abdominal tenderness.   Musculoskeletal: Normal range of motion.         General: No swelling.   Skin:     Comments: Red, dry pustualar rash R neck   Neurological:      General: No focal deficit present.      Mental Status: She is alert and oriented to person, place, and time.      Motor: Weakness present.      Gait: Gait normal.           LABS:   Reviewed.     ASSESSMENT:      ICD-10-CM    1. Anemia, unspecified type  D64.9    2. Herpes zoster without complication  B02.9    3. FTT (failure to thrive) in adult  R62.7    4. Hyponatremia  E87.1        PLAN:      Unilateral rash right neck line to the jaw and upper scapula.  Shingles:  pustular, dried over with obvious self excoriation.  Spoke with patient's daughter who says the rash was not nearly as extensive when she dropped her off at the ER less than a week ago.  She had washed her hair that day did not notice any of this rash on her face, jawline and said it was only a few pea-sized bumps underneath her hairline.  Given this information, and will treat as shingles despite " it being greater than 72 hours, and is continuing to have symptoms and spread.   -We will start valacyclovir 1000 units p.o. daily x7 days.  Dosed for renal impairment.  -Continue mupirocin ointment twice daily.  -Please attempt to get into dermatology sooner than March 8.    Hyponatremia  Failure to thrive  PEDRO: I do think she has some hypovolemia however P.o. intake improving.  Urine sodium and serum osmolality was added to BMP today to check for SIADH, the osmolality was not done and the urine sodium was less than 20 which is likely not consistent with SIADH.  BMP also revealed improved sodium to 133.  This is consistent with her improved intake.  We will add a serum osmolality to Tuesday and recheck the BMP.      GERD: Denies any active symptoms.  Takes famotidine.    Low back pain and lumbosacral disease: Continues on tramadol which we can monitor use of.    Face to face time with patient as well as 10 minutes on phone with daughter for total >20 minutes face to face and >35 minutes spent on care and coordination of rash, obtaining history from daughter as well as discussing patients prior to admission home life and baseline for ADLs and mentation.      Electronically signed by: Yareli Moore, ANTHONY

## 2021-06-15 NOTE — TELEPHONE ENCOUNTER
[facility] Nurse Triage Telephone Note      Provider: Laura Torres NP  Facility: [facility]    Facility Type: TCU    Caller: Staci  Call Back Number:  [phone]    Allergies: Patient has no known allergies.    Reason for call: Nurse calling to report iron studies, Hgb, B12, ferritin, urine sodium and urine osmolality results.  Notable meds:  Protonix 20mg two times a day, Prevacid 30mg two times a day, Famotidine 40mg daily.  Of note, patient is scheduled to discharge home tomorrow.       Verbal Order/Direction given by Provider: Ferrous sulfate 325mg two times a day.  Follow up with primary provider.      Provider giving order: ESTUARDO Esparza    Verbal order given to: Staci Jackson RN

## 2021-06-15 NOTE — PROGRESS NOTES
[facility]    Facility:   [facility] [[id number]]   Code Status: POLST AVAILABLE      CHIEF COMPLAINT/REASON FOR VISIT:  Chief Complaint   Patient presents with     Problem Visit     rash       HISTORY:      HPI: Abeba is a 80 y.o. female history of CKD, GERD, low back pain and opioid dependence who was seen in the emergency room with failure to thrive and generalized weakness.  She was thought to have had a community-acquired pneumonia and is being treated for that although she says that she does not have pneumonia.  She typically lives at home in her own apartment and her daughter stops by every couple weeks to help with showering and groceries.  She is limited in her history and does not want to engage much in conversation, rest of her history is obtained through chart review which is also minimal if she did not have a hospitalization and was only seen in the ER at [facility].    Today: Seen per nursing request for concerns of red, pustular rash with dried drainage, patient reports extensive itching and appears she has obvious self excoriation.  She reports this is been here for a couple months however the reliability of this timeline is questionable.  She reports it only is itching but denies pain.  She was seen in the ER at [facility] last week and they diagnosis is probable psoriasis, however she has no history of this and no family history.  She denies any fever, body aches or pains.  It is mostly unilateral and shingles could be suspected.  She is clearly been scratching this and I am concerned for secondary infection so we will add topical antibiotic ointment and attempt to move her dermatology appointment up to a sooner date.  Current date March 8.  She is otherwise improving her p.o. intake and is a little bit more talkative and open today when she was yesterday.  She is compliant with therapies and is not asking to leave as much for nursing.    Past Medical  History:   Diagnosis Date     Adult failure to thrive      PEDRO (acute kidney injury) (H)      Altered mental status      Chronic low back pain      CKD (chronic kidney disease)      Elevated serum creatinine      GERD (gastroesophageal reflux disease)      Lumbar canal stenosis      Milk alkali syndrome              Family History   Problem Relation Age of Onset     Alcohol abuse Father      Lung cancer Father      Diabetes Father      Depression Brother         suicide     Social History     Socioeconomic History     Marital status:      Spouse name: Not on file     Number of children: Not on file     Years of education: Not on file     Highest education level: Not on file   Occupational History     Not on file   Social Needs     Financial resource strain: Not on file     Food insecurity     Worry: Not on file     Inability: Not on file     Transportation needs     Medical: Not on file     Non-medical: Not on file   Tobacco Use     Smoking status: Former Smoker     Packs/day: 0.50     Types: Cigarettes     Smokeless tobacco: Never Used   Substance and Sexual Activity     Alcohol use: Yes     Drug use: Not on file     Sexual activity: Not on file   Lifestyle     Physical activity     Days per week: Not on file     Minutes per session: Not on file     Stress: Not on file   Relationships     Social connections     Talks on phone: Not on file     Gets together: Not on file     Attends Muslim service: Not on file     Active member of club or organization: Not on file     Attends meetings of clubs or organizations: Not on file     Relationship status: Not on file     Intimate partner violence     Fear of current or ex partner: Not on file     Emotionally abused: Not on file     Physically abused: Not on file     Forced sexual activity: Not on file   Other Topics Concern     Not on file   Social History Narrative     Not on file         Review of Systems   Constitutional: Negative.    HENT: Negative.    Eyes:  "Negative.    Respiratory: Negative.    Cardiovascular: Negative.    Endocrine: Negative.    Genitourinary: Negative.    Musculoskeletal: Positive for back pain.   Skin: Positive for rash.   Neurological: Positive for weakness. Negative for dizziness and headaches.   Hematological: Negative.    Psychiatric/Behavioral: Positive for agitation. Negative for behavioral problems.       Vitals:    01/26/21 1551   BP: 109/74   Pulse: 92   Resp: 16   Temp: 97.5  F (36.4  C)   SpO2: 100%   Weight: 112 lb 6.4 oz (51 kg)   Height: 5' 5\" (1.651 m)       Physical Exam  Constitutional:       Appearance: Normal appearance. She is not ill-appearing.   HENT:      Head: Atraumatic.   Eyes:      General: No scleral icterus.  Cardiovascular:      Rate and Rhythm: Normal rate and regular rhythm.   Pulmonary:      Effort: Pulmonary effort is normal.      Breath sounds: Normal breath sounds.   Abdominal:      General: There is no distension.      Palpations: Abdomen is soft.      Tenderness: There is no abdominal tenderness.   Musculoskeletal: Normal range of motion.         General: No swelling.   Skin:     Comments: Red, dry pustualar rash R neck   Neurological:      General: No focal deficit present.      Mental Status: She is alert and oriented to person, place, and time.      Motor: Weakness present.      Gait: Gait normal.           LABS:   Reviewed.     ASSESSMENT:      ICD-10-CM    1. unilateral rash  R21    2. FTT (failure to thrive) in adult  R62.7        PLAN:      Unilateral rash right neck line to the jaw and upper scapula.  Pustular, dried over with obvious self excoriation.  Patient reports this is been here for \"probably for months\", I am unsure of her reliability in regards to this timeline.  Is some of the areas do look like possible shingles vesicles, however it is quite advanced.  She recalls that itching but denies any burning or pain.  -Continue triamcinolone Twice daily for itching.  -Add mupirocin ointment twice " daily.  -Please attempt to get into dermatology sooner than March 8.    Failure to thrive  PEDRO: I do think she has some hypovolemia and poor p.o. intake, will monitor her labs and recheck on Thursday to monitor kidney function.  P.o. intake improving.  Cooperative with therapies.  -BMP on Thursday.    GERD: Denies any active symptoms.  Takes famotidine.    Low back pain and lumbosacral disease: Continues on tramadol which we can monitor use of.      Electronically signed by: Yareli Moore, CNP

## 2021-06-16 ENCOUNTER — OFFICE VISIT - HEALTHEAST (OUTPATIENT)
Dept: GERIATRICS | Facility: CLINIC | Age: 81
End: 2021-06-16

## 2021-06-16 DIAGNOSIS — N18.30 STAGE 3 CHRONIC KIDNEY DISEASE, UNSPECIFIED WHETHER STAGE 3A OR 3B CKD (H): ICD-10-CM

## 2021-06-16 DIAGNOSIS — G47.01 INSOMNIA DUE TO MEDICAL CONDITION: ICD-10-CM

## 2021-06-16 DIAGNOSIS — R62.7 FTT (FAILURE TO THRIVE) IN ADULT: ICD-10-CM

## 2021-06-16 DIAGNOSIS — E46 PROTEIN-CALORIE MALNUTRITION, UNSPECIFIED SEVERITY (H): ICD-10-CM

## 2021-06-16 DIAGNOSIS — K21.9 GASTROESOPHAGEAL REFLUX DISEASE WITHOUT ESOPHAGITIS: ICD-10-CM

## 2021-06-16 DIAGNOSIS — D64.9 ANEMIA, UNSPECIFIED TYPE: ICD-10-CM

## 2021-06-16 DIAGNOSIS — R60.9 DEPENDENT EDEMA: ICD-10-CM

## 2021-06-16 PROBLEM — R21 RASH: Status: ACTIVE | Noted: 2021-01-27

## 2021-06-16 PROBLEM — M54.40 CHRONIC MIDLINE LOW BACK PAIN WITH SCIATICA, SCIATICA LATERALITY UNSPECIFIED: Status: ACTIVE | Noted: 2021-02-15

## 2021-06-16 PROBLEM — R60.0 EDEMA OF BOTH LOWER EXTREMITIES: Status: ACTIVE | Noted: 2021-06-08

## 2021-06-16 PROBLEM — F41.8 MIXED ANXIETY AND DEPRESSIVE DISORDER: Status: ACTIVE | Noted: 2019-04-02

## 2021-06-16 PROBLEM — M48.061 LUMBAR FORAMINAL STENOSIS: Status: ACTIVE | Noted: 2018-08-31

## 2021-06-16 PROBLEM — E83.52 HYPERCALCEMIA: Status: ACTIVE | Noted: 2020-04-08

## 2021-06-16 PROBLEM — N17.9 AKI (ACUTE KIDNEY INJURY) (H): Status: ACTIVE | Noted: 2020-04-08

## 2021-06-16 PROBLEM — K59.00 CONSTIPATION: Status: ACTIVE | Noted: 2020-04-10

## 2021-06-16 PROBLEM — F11.20 UNCOMPLICATED OPIOID DEPENDENCE (H): Status: ACTIVE | Noted: 2021-02-15

## 2021-06-16 PROBLEM — G89.29 CHRONIC MIDLINE LOW BACK PAIN WITH SCIATICA, SCIATICA LATERALITY UNSPECIFIED: Status: ACTIVE | Noted: 2021-02-15

## 2021-06-16 PROBLEM — F51.01 PRIMARY INSOMNIA: Status: ACTIVE | Noted: 2021-02-15

## 2021-06-16 PROBLEM — B02.9 HERPES ZOSTER WITHOUT COMPLICATION: Status: ACTIVE | Noted: 2021-02-10

## 2021-06-16 PROBLEM — B02.29 POST HERPETIC NEURALGIA: Status: ACTIVE | Noted: 2021-05-07

## 2021-06-16 PROBLEM — E87.1 HYPONATREMIA: Status: ACTIVE | Noted: 2021-02-10

## 2021-06-17 NOTE — PROGRESS NOTES
[facility]    Facility:   [facility] [[id number]]   Code Status: POLST AVAILABLE      CHIEF COMPLAINT/REASON FOR VISIT:  Chief Complaint   Patient presents with     Problem Visit     anemia, post herpetic nuerolgia        HISTORY:      HPI: Abeba is a 80 y.o. female history of CKD, GERD, low back pain and opioid dependence who is known to this facility from previous admission in February 2021.   She presented to [facility] ED with weakness and R ear pain with resolving shingles rash. Her daughter is concerned that she is failing at home and will need custodial and this time Abeba agrees with this. She admits to increasing difficulty cooking and dressing herself and not eating much. She is eating and drinking well in the TCU.   Her CT scan of head was negative for acute process in the ED. Pain likley post herpetic pain.    Today: Alejandra seen to follow-up on labs, she is a mild anemia with a hemoglobin of 9, previously 7.2 in January and February.  She was started on multivitamin with minerals that time and her MCV has increased from 79-92.  Denying active symptoms of GERD however she remains on famotidine.  She is reporting some postherpetic neuralgia and agreeable to increasing her gabapentin to 400 twice a day.  Lidocaine does help a little bit as well.  As well as ice.  She is otherwise eating and drinking well and her mood is notably brighter and more conversational compared to previous admission.    Past Medical History:   Diagnosis Date     Adult failure to thrive      PEDRO (acute kidney injury) (H)      Altered mental status      Chronic low back pain      CKD (chronic kidney disease)      Elevated serum creatinine      GERD (gastroesophageal reflux disease)      Lumbar canal stenosis      Milk alkali syndrome              Family History   Problem Relation Age of Onset     Alcohol abuse Father      Lung cancer Father      Diabetes Father      Depression Brother         suicide      Social History     Socioeconomic History     Marital status:      Spouse name: Not on file     Number of children: Not on file     Years of education: Not on file     Highest education level: Not on file   Occupational History     Not on file   Social Needs     Financial resource strain: Not on file     Food insecurity     Worry: Not on file     Inability: Not on file     Transportation needs     Medical: Not on file     Non-medical: Not on file   Tobacco Use     Smoking status: Former Smoker     Packs/day: 0.50     Types: Cigarettes     Smokeless tobacco: Never Used   Substance and Sexual Activity     Alcohol use: Yes     Drug use: Not on file     Sexual activity: Not on file   Lifestyle     Physical activity     Days per week: Not on file     Minutes per session: Not on file     Stress: Not on file   Relationships     Social connections     Talks on phone: Not on file     Gets together: Not on file     Attends Gnosticist service: Not on file     Active member of club or organization: Not on file     Attends meetings of clubs or organizations: Not on file     Relationship status: Not on file     Intimate partner violence     Fear of current or ex partner: Not on file     Emotionally abused: Not on file     Physically abused: Not on file     Forced sexual activity: Not on file   Other Topics Concern     Not on file   Social History Narrative     Not on file         Review of Systems   Constitutional: Negative.    HENT: Negative.    Eyes: Negative.    Respiratory: Negative.    Cardiovascular: Negative.    Endocrine: Negative.    Genitourinary: Negative.    Musculoskeletal: Positive for back pain.   Skin: Negative for rash.   Neurological: Positive for weakness. Negative for dizziness and headaches.   Hematological: Negative.    Psychiatric/Behavioral: Negative for behavioral problems.       Vitals:    05/05/21 1205   BP: 129/65   Pulse: 86   Resp: 18   Temp: (!) 96.3  F (35.7  C)   SpO2: 95%   Weight: 106  "lb 12.8 oz (48.4 kg)   Height: 5' 5\" (1.651 m)       Physical Exam  Constitutional:       Appearance: Normal appearance. She is not ill-appearing.   HENT:      Head: Atraumatic.   Eyes:      General: No scleral icterus.  Cardiovascular:      Rate and Rhythm: Normal rate and regular rhythm.   Pulmonary:      Effort: Pulmonary effort is normal.      Breath sounds: Normal breath sounds.   Abdominal:      General: There is no distension.      Palpations: Abdomen is soft.      Tenderness: There is no abdominal tenderness.   Musculoskeletal: Normal range of motion.         General: No swelling.   Neurological:      General: No focal deficit present.      Mental Status: She is alert and oriented to person, place, and time.      Motor: Weakness present.      Gait: Gait normal.           LABS:   Reviewed.     ASSESSMENT:      ICD-10-CM    1. Post herpetic neuralgia  B02.29    2. Anemia, unspecified type  D64.9        PLAN:      Unilateral post herpetic neurologia:   -Increase gabapentin 400mg po two times a day.   -Tylenol three times a day.   -Tylenol 650mg po q noc prn .     Hyponatremia  Failure to thrive  PEDRO: Eating and drinking well here.    -Recheck BMP on Tuesday.   -Remeron 7.5mg po at bedtime.   -Multivitamin 1 tab daily.     Major depression: Mood is notably improved compared to last admission in February. Smiling and conversational.   -Trazodone 150mg po at bedtime.     Anemia: Hemoglobin 9.0, previously 7.2.  On multivitamin with minerals however may need iron supplement if hemoglobin dropping and RDW elevated.  MCV is 92.  Previously 79 in January.     GERD: Denies any active symptoms.  Takes famotidine.          Electronically signed by: Yareli Moore CNP  "

## 2021-06-17 NOTE — TELEPHONE ENCOUNTER
Telephone Encounter by Maira Luo RN at 5/19/2021  2:13 PM     Author: Maira Luo RN Service: -- Author Type: Registered Nurse    Filed: 5/19/2021  3:04 PM Encounter Date: 5/19/2021 Status: Signed    : Miara Luo RN (Registered Nurse)       [facility] Nurse Triage Telephone Note      Provider: Laura Torres NP  Facility: [facility]    Facility Type: [facility]    Caller: Michael  Call Back Number:  [phone]    Allergies: Patient has no known allergies.    Reason for call: Nurse reporting Hgb from 5/19:    Has Hgb and Iron scheduled for 5/21/21. VS stable. No concerns.    Verbal Order/Direction given by Provider: NNO    Provider giving order: Laura Torres NP    Verbal order given to: Michael Luo RN

## 2021-06-17 NOTE — PROGRESS NOTES
[facility]    Facility:   [facility] [[id number]]   Code Status: POLST AVAILABLE      CHIEF COMPLAINT/REASON FOR VISIT:  Chief Complaint   Patient presents with     Hospital Visit Follow Up     Weakness, FTT       HISTORY:      HPI: Abeba is a 80 y.o. female history of CKD, GERD, low back pain and opioid dependence who is known to this facility from previous admission in February 2021.   She presented to [facility] ED with weakness and R ear pain with resolving shingles rash. Her daughter is concerned that she is failing at home and will need [facility] and this time Abeba agrees with this. She admits to increasing difficulty cooking and dressing herself and not eating much. She is eating and drinking well in the TCU.   Her CT scan of head was negative for acute process in the ED. Pain likley post herpetic pain.    She had a mild PEDRO in the ED.   She was started on mirtazipine for appetite. She is also on nutrition supplements.   She is sitting up on the side of the bed today and has some c/o discomfort on the R side of her neck where her previous shingles outbreak occurred. She is very pleasant and has notably improved mood and willingness to stay in the facility.     Past Medical History:   Diagnosis Date     Adult failure to thrive      PEDRO (acute kidney injury) (H)      Altered mental status      Chronic low back pain      CKD (chronic kidney disease)      Elevated serum creatinine      GERD (gastroesophageal reflux disease)      Lumbar canal stenosis      Milk alkali syndrome              Family History   Problem Relation Age of Onset     Alcohol abuse Father      Lung cancer Father      Diabetes Father      Depression Brother         suicide     Social History     Socioeconomic History     Marital status:      Spouse name: Not on file     Number of children: Not on file     Years of education: Not on file     Highest education level: Not on file   Occupational History     Not  "on file   Social Needs     Financial resource strain: Not on file     Food insecurity     Worry: Not on file     Inability: Not on file     Transportation needs     Medical: Not on file     Non-medical: Not on file   Tobacco Use     Smoking status: Former Smoker     Packs/day: 0.50     Types: Cigarettes     Smokeless tobacco: Never Used   Substance and Sexual Activity     Alcohol use: Yes     Drug use: Not on file     Sexual activity: Not on file   Lifestyle     Physical activity     Days per week: Not on file     Minutes per session: Not on file     Stress: Not on file   Relationships     Social connections     Talks on phone: Not on file     Gets together: Not on file     Attends Evangelical service: Not on file     Active member of club or organization: Not on file     Attends meetings of clubs or organizations: Not on file     Relationship status: Not on file     Intimate partner violence     Fear of current or ex partner: Not on file     Emotionally abused: Not on file     Physically abused: Not on file     Forced sexual activity: Not on file   Other Topics Concern     Not on file   Social History Narrative     Not on file         Review of Systems   Constitutional: Negative.    HENT: Negative.    Eyes: Negative.    Respiratory: Negative.    Cardiovascular: Negative.    Endocrine: Negative.    Genitourinary: Negative.    Musculoskeletal: Positive for back pain.   Skin: Negative for rash.   Neurological: Positive for weakness. Negative for dizziness and headaches.   Hematological: Negative.    Psychiatric/Behavioral: Negative for behavioral problems.       Vitals:    04/29/21 1150   BP: 152/65   Pulse: (!) 102   Resp: 18   Temp: 98  F (36.7  C)   SpO2: 99%   Weight: 106 lb 12.8 oz (48.4 kg)   Height: 5' 5\" (1.651 m)       Physical Exam  Constitutional:       Appearance: Normal appearance. She is not ill-appearing.   HENT:      Head: Atraumatic.   Eyes:      General: No scleral icterus.  Cardiovascular:      Rate " and Rhythm: Normal rate and regular rhythm.   Pulmonary:      Effort: Pulmonary effort is normal.      Breath sounds: Normal breath sounds.   Abdominal:      General: There is no distension.      Palpations: Abdomen is soft.      Tenderness: There is no abdominal tenderness.   Musculoskeletal: Normal range of motion.         General: No swelling.   Neurological:      General: No focal deficit present.      Mental Status: She is alert and oriented to person, place, and time.      Motor: Weakness present.      Gait: Gait normal.           LABS:   Reviewed.     ASSESSMENT:      ICD-10-CM    1. Chronic midline low back pain with sciatica, sciatica laterality unspecified  M54.40     G89.29    2. Post herpetic neuralgia  B02.29    3. Moderate episode of recurrent major depressive disorder (H)  F33.1        PLAN:      Unilateral post herpetic neurologia:   -Gabapentin 300mg po two times a day.   -Tylenol three times a day.   Tylenol 650mg po q noc prn .     Hyponatremia  Failure to thrive  PEDRO: Eating and drinking well here.   -Recheck BMP on Tuesday.   -Remeron 7.5mg po at bedtime.   -Multivitamin 1 tab daily.     Major depression: Mood is notably improved compared to last admission in February. Smiling and conversational.   -Trazodone 150mg po at bedtime.     GERD: Denies any active symptoms.  Takes famotidine.         Electronically signed by: Yareli Moore CNP

## 2021-06-17 NOTE — TELEPHONE ENCOUNTER
[facility] Nurse Triage Telephone Note      Provider: Laura Torres NP  Facility: [facility]    Facility Type: TCU    Caller: Alejandra   Call Back Number:  [phone]    Allergies: Patient has no known allergies.    Reason for call: Nursing is calling today updating a Hgb that was done done 5/16/21 of 7.9 which has improved from 5/13/21 from 7.2. No concerns of bleeding at this time, VS stable. The pt has a full HM2 and Iron panel scheduled for tomorrow 5/18/21.     Verbal Order/Direction given by Provider: NNO    Provider giving order: Laura Torres NP    Verbal order given to: Alejandra Jerez RN

## 2021-06-20 NOTE — LETTER
Letter by Hailey Wilkinson MBBS at      Author: Haliey Wilkinson MBBS Service: -- Author Type: --    Filed:  Encounter Date: 4/7/2020 Status: (Other)         Patient: Abeba Martinez   MR Number: [id number]   YOB: 1940   Date of Visit: 4/7/2020       [facility] Admission note      Patient: Abeba Martinez  MRN: [id number]      [facility] [[id number]]  Reason for Visit     Chief Complaint   Patient presents with   ? H & P   Follow-up on hospitalization as well as renal failure and hypercalcemia    Code Status     Dnr/ dni    Assessment     -PEDRO in the setting of chronic kidney disease stage III.  -Symptomatic hypercalcemia.  - Profound calorie deficiency malnutrition.  BMI 17.  -Leukopenia with lymphopenia.  -History of chronic low back pain.  -History of depression  -Severe GERD  - Generalized weakness  -Chronic anemia    Plan     This is a video encounter which was done with the consent of the patient this was facilitated by a R who helped in the examination part of theN encounter as well as interview part  Patient has been admitted to the TCU.  She had presented to the hospital with ongoing weakness ongoing for 2 to 3 weeks  Work-up revealed that she had significant hypercalcemia with a calcium of 12.5 and renal failure with an admission creatinine of 2.4.  She was admitted and given hydration with improvement in labs.  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  Her additional work-up including vitamin D and serum and urine protein electrophoresis is pending.  In light of recurrent hypercalcemia as well as renal failure urgent nephrology referral for follow-up as an outpatient has been given to her.  In addition renal failure did improve with hydration she will need close monitoring in the TCU.  Her underlying etiology severe GERD for which patient has been drinking a lot of milk she was advised not to do so.  She  is discharged on a higher dose of PPIs with Tums  She will see GI if her symptoms continue  Refill on her tramadol given in an emergency E KIT authorization given for chronic low back pain issues.  Dietary consult requested for her low BMI of 17 with malnutrition and underweight status.  She has depressions of mood and behaviors will need to be monitored  Overall patient is feeling better but is quite upset she feels nothing much was done for her and she has been discharged she was updated that she had work-up and some of the results are pending.  She also reports that she is not been having any daiRY.  We will be watching her GERD symptoms and weights closely and she is aware of that  Medication review with her was also done including reviewing her antidepressant and insomnia medications which patient states are essential for her and requesting no taper on those  She is ambulating with a walker and discharge plan is to go home but she lives in independent living apartment.  Plan is to upgrade services to an [facility] where she can get services  Start date 4/7/2020 at 3 PM  Stop time  4/7/2020 at 3:35 PM  Location of patient is [facility] nursing [facility]  Location of MD is home office    History     Patient is a very pleasant 79 y.o. female who is admitted to TCU  Patient admitted to the hospital with acute hypercalcemia.  She was given hydration with improvement this apparently is a chronic problem with her due to ongoing excessive dairy intake.  She will have a repeat BMP done in the TCU and they have recommended cessation of dairy intake.  Unfortunately patient has underlying history of profound GERD.  They have recommended she stop using dairy to cure diet instead use Tums along with a higher dose of PPI.  She had additional work-up done including serum protein teen electrophoresis and vitamin D levels which will need to be monitored.  She also had acute renal insufficiency in the setting of chronic kidney disease  stage III a renal referral was placed for her.  She will have a recheck BMP done in the TCU.  Patient has significant malnutrition and is underweight.  BMI was 17 due to malnutrition.  They recommended starting her on supplements and monitoring intake.  She has chronic low back pain she is on Neurontin and tramadol nursing is calling because apparently hospital did not give her any scripts she is a requesting a urgent authorization for her.  Eventually she did get tramadol with improvement in her back pain  Patient also has a history of depression which will need to be monitored  Reporting mood is stable    Past Medical History     Active Ambulatory (Non-Hospital) Problems    Diagnosis   ? Hypercalcemia   ? PEDRO (acute kidney injury) (H)     Past Medical History:   Diagnosis Date   ? Adult failure to thrive    ? PEDRO (acute kidney injury) (H)    ? Altered mental status    ? Chronic low back pain    ? CKD (chronic kidney disease)    ? Elevated serum creatinine    ? GERD (gastroesophageal reflux disease)    ? Lumbar canal stenosis    ? Milk alkali syndrome        Past Social History     Reviewed, and she  reports that she has quit smoking. Her smoking use included cigarettes. She smoked 0.50 packs per day. She has never used smokeless tobacco. She reports current alcohol use.Lives in independent living apartment she hopes to discharge to assisted living with upgrading of services no smoking no alcohol currently    Family History     Reviewed, and includes a history of type 2 diabetes in her father and alcohol abuse.  Father also had lung cancer Brother has depression with history of suicide    Medication List   Post Discharge Medication Reconciliation Status: discharge medications reconciled and changed, per note/orders (see AVS)   acetaminophen (TYLENOL) 500 MG tablet    Sig: Take 1,000 mg by mouth every 6 (six) hours as needed for pain.    Class: Historical Med    Route: Oral    polyvinyl alcohol (LIQUIFILM TEARS) 1.4  % ophthalmic solution    Sig: Administer 1 drop to both eyes every 4 (four) hours as needed for dry eyes.    Class: Historical Med    Route: Both Eyes    FLUoxetine (PROZAC) 20 MG tablet    Sig: Take 20 mg by mouth daily.    Class: Historical Med    Route: Oral    gabapentin (NEURONTIN) 100 MG capsule    Sig: Take 200 mg by mouth as needed.    Class: Historical Med    Route: Oral    lansoprazole (PREVACID) 30 MG capsule    Sig: Take 30 mg by mouth 2 (two) times a day.    Class: Historical Med    Route: Oral    senna-docusate (SENNOSIDES-DOCUSATE SODIUM) 8.6-50 mg tablet    Sig: Take 2 tablets by mouth every 12 (twelve) hours as needed for constipation.    Class: Historical Med    Route: Oral    simethicone (MYLICON) 80 MG chewable tablet    Sig: Chew 80 mg every 6 (six) hours as needed for flatulence.    Class: Historical Med    Route: Oral    traZODone (DESYREL) 150 MG tablet    Sig: Take 150 mg by mouth at bedtime.    Class: Historical Med    Route: Oral          Allergies     No Known Allergies    Review of Systems   A comprehensive review of 14 systems was done. Pertinent findings noted here and in history of present illness. All the rest negative.  Constitutional: Negative.  Negative for fever, chills, she has activity change, appetite change and fatigue.   HENT: Negative for congestion and facial swelling.    Eyes: Negative for photophobia, redness and visual disturbance.   Respiratory: Negative for cough and chest tightness.    Cardiovascular: Negative for chest pain, palpitations and leg swelling.   Gastrointestinal: Negative for nausea, diarrhea, constipation, blood in stool and abdominal distention.   Genitourinary: Negative.    Musculoskeletal: Negative.  Reporting profound weakness  Skin: Negative.    Neurological: Negative for dizziness, tremors, syncope, weakness, light-headedness and headaches.   Hematological: Does not bruise/bleed easily.   Psychiatric/Behavioral: Negative.        Physical Exam      Pressure 104/67 temp 98 pulse 84 WT 109LB    Constitutional: Oriented to person, place, and time and appears well-developed.  Very frail and weak  HEENT:  Normocephalic and atraumatic.  Eyes: Conjunctivae and EOM are normal. Pupils are equal, round, and reactive to light. No discharge.  No scleral icterus. Nose normal. Mouth/Throat: Oropharynx is clear and moist. No oropharyngeal exudate.    NECK: Normal range of motion. Neck supple. No JVD present. No tracheal deviation present. No thyromegaly present.   CARDIOVASCULAR: Normal rate, regular rhythm and intact distal pulses.    No JVD noted on visual exam  PULMONARY: Effort normal and breath sounds normal. No respiratory distress.  ABDOMEN: Soft. Bowel sounds are normal. No distension and no mass.  There is no tenderness. There is no rebound and no guarding. No HSM.  MUSCULOSKELETAL: Normal range of motion. No edema and no tenderness. Mild kyphosis, no tenderness.  LYMPH NODES: Has no cervical, supraclavicular, axillary and groin adenopathy.   NEUROLOGICAL: Alert and oriented to person, place, and time. No cranial nerve deficit.  Normal muscle tone. Coordination normal.   GENITOURINARY: Deferred exam.  SKIN: Skin is warm and dry. No rash noted. No erythema. No pallor.   EXTREMITIES: No cyanosis, no clubbing, no edema. No Deformity.  PSYCHIATRIC: Normal mood, affect and behavior.      Lab Results     Her admission labs included a white count of 6 and a hemoglobin of 10.4.  Troponins were 0.01  BNP 29  TSH normal at 0.5  Admission creatinine was 2.4 baseline is 1.3  Calcium 12.5    Imaging Results     X-ray of her chest done in the hospital did show normal cardiac size and pulmonary vascularity DJD of the shoulder seen          GIANLUCA Lira

## 2021-06-20 NOTE — LETTER
"Letter by Mandy Zhang CNP at      Author: Mandy Zhang CNP Service: -- Author Type: --    Filed:  Encounter Date: 4/10/2020 Status: (Other)         Patient: Abeba Martinez   MR Number: [id number]   YOB: 1940   Date of Visit: 4/10/2020     [facility]   Video Visit    Code Status: DNR    Abeba Martinez is a 79 y.o. female who is being evaluated via a billable video visit.      The patient has been notified of following:     \"This video visit will be conducted via a call between you and your physician/provider. We have found that certain health care needs can be provided without the need for an in-person physical exam.  This service lets us provide the care you need with a video conversation.  If a prescription is necessary we can send it to the facility team.  If lab work is needed we can place an order through the facility team to have that test done at a later time.    If during the course of the call the physician/provider feels a video visit is not appropriate, you will not be charged for this service.\"     Physician/provider has received verbal consent for a Video Visit from the patient? Yes        Video Start Time: 1125am    Chief Complaint/Reason for Visit:  Chief Complaint   Patient presents with   ? Discharge Summary       HPI:   Abeba is a 79 y.o. female who is seen today for an unanticipated dc home.She is to dc home AMA, but to make this a safer dc we are seeing her and will have [facility] services follow up with her. She will have RN, PT/OT follow her in her home in [facility]. Abeba reports her son lives nearby and will check in with her. We did review her meds, having nursing help her set these up, pain management for pain and her BM status. She is instructed to take senna as ordered, she was educated on proper exercise and diete to help constipation. Also encouraged to fu with her PCP within 7 days. She will need labs on 4/13 as per hospital dc. She came from " [facility] where she was inpatient from 4/4 to 4/6 She had presented with weakness due to hypercalcemia and it would benefiet her to have therapies in the TCU. She is refusing to stay and her son is picking her up shortly.     Minimal Physical exam due to telehealth:   Respirations even unlabored.  ExtremetiesL Sitting upright, ambulates with poor gait  Expoused skin w/o bruising or lesions  Nursing reports BS present  Lungs clear  HR: RRR      I have reviewed and updated the patient's Past Medical History, Social History, Family History and Medication List.    ALLERGIES  Patient has no known allergies.    Review of Systems   Does reports some BM concerns, has senna and feels this helps with BMS, negative for NV, shortness of breath, CP or other complaints    Vitals:    04/10/20 1211   BP: 103/57   Pulse: 90   Temp: 97.6  F (36.4  C)   Weight: 109 lb 12.8 oz (49.8 kg)         MEDICATION LIST:  Current Outpatient Medications   Medication Sig   ? acetaminophen (TYLENOL) 500 MG tablet Take 1,000 mg by mouth every 6 (six) hours as needed for pain.   ? FLUoxetine (PROZAC) 20 MG tablet Take 20 mg by mouth daily.   ? gabapentin (NEURONTIN) 100 MG capsule Take 200 mg by mouth as needed.   ? lansoprazole (PREVACID) 30 MG capsule Take 30 mg by mouth 2 (two) times a day.   ? polyvinyl alcohol (LIQUIFILM TEARS) 1.4 % ophthalmic solution Administer 1 drop to both eyes every 4 (four) hours as needed for dry eyes.   ? senna-docusate (SENNOSIDES-DOCUSATE SODIUM) 8.6-50 mg tablet Take 2 tablets by mouth every 12 (twelve) hours as needed for constipation.   ? simethicone (MYLICON) 80 MG chewable tablet Chew 80 mg every 6 (six) hours as needed for flatulence.   ? traMADoL (ULTRAM) 50 mg tablet Take 1 tablet (50 mg total) by mouth 2 (two) times a day as needed for pain.   ? traZODone (DESYREL) 150 MG tablet Take 75 mg by mouth at bedtime.        Labs: Fu labs with [facility] on Monday 4/13/20    Assessment/Plan:    ICD-10-CM    1.  Hypercalcemia  E83.52    2. PEDRO (acute kidney injury) (H)  N17.9    3. Drug-induced constipation  K59.03       .      DISCHARGE PLAN/FACE TO FACE: I certify that this patient is under Dr. Wilkinson's care, seen by the NP, and had a face-to-face encounter that meets the physician face-to-face encounter requirements.  The encounter was in whole, or part related to the primary reason for home health.  The Patient is homebound due to: General deconditioned state due to hyponatremia and CHF and  it is taxing and it will take a considerable amount of effort for patient to leave the home.  She is dependent on others for transportation.  The patient is confined to her home and needs intermittent skilled nursing, PT,OT, RN, and HHA.  The patient has been under the care of Dr. Wilkinson/NP and Dr. Wilkinson  initiated the establishment of the plan of care.         Patient to be followed by home care for physical therapy to eval and treat for strengthening, balance, endurance, and safety with mobility, and ambulation.  Patient to be followed by home care for occupational therapy to eval and treat for strengthening, ADL needs, adaptive equipment, and safety.  Patient to be followed by home care for nursing services for medication set up and teaching, symptom and disease processes monitoring and education.    Patient to be followed by home care for home health aid services for bathing and ADL needs.  Planned discharge.  All therapy goals have been met.  Family will assist with discharge and transportation.           Patient will follow up with PCP within 7- days after discharge for medication mangagment and appropriate lab studies.         PCP: Nicanor Juarez MD   Phone: [phone]   Fax: [phone]    Video-Visit Details    Type of service:  Video Visit    Video End Time (time video stopped): 1145 am    Originating Location (pt. Location):[facility] [[id number]]    Distant Location (provider location):  [facility]  [facility]   Post Discharge Medication Reconciliation Status: discharge medications reconciled and changed, per note/orders (see AVS)  Mode of Communication:  Zoom Video Conference    Mandy Zhang CNP    Attestation signed by Hailey Wilkinson MBBS at 4/10/2020 12:49 PM:  AGREE WITH DC NOTE

## 2021-06-21 NOTE — LETTER
Letter by Yareli Moore CNP at      Author: Yareli Moore CNP Service: -- Author Type: --    Filed:  Encounter Date: 1/28/2021 Status: (Other)         [facility]  [address] [place] [address]                                  February 10, 2021    Patient: Abeba Martinez   MR Number: [id number]   YOB: 1940   Date of Visit: 1/28/2021     Dear Dr. Alvarenga:    Thank you for referring Abeba Martinez to me for evaluation. Below are the relevant portions of my assessment and plan of care.    If you have questions, please do not hesitate to call me. I look forward to following Abeba along with you.    Sincerely,        Yareli Moore CNP          CC  No Recipients  Yareli Moore CNP  2/10/2021  1:56 PM  Sign when Signing Visit  [facility]    Facility:   [facility] [[id number]]   Code Status: POLST AVAILABLE      CHIEF COMPLAINT/REASON FOR VISIT:  Chief Complaint   Patient presents with   ? Problem Visit     hyponatremia, rash        HISTORY:      HPI: Abeba is a 80 y.o. female history of CKD, GERD, low back pain and opioid dependence who was seen in the emergency room with failure to thrive and generalized weakness.  She was thought to have had a community-acquired pneumonia and is being treated for that although she says that she does not have pneumonia.  She typically lives at home in her own apartment and her daughter stops by every couple weeks to help with showering and groceries.  She is limited in her history and does not want to engage much in conversation, rest of her history is obtained through chart review which is also minimal if she did not have a hospitalization and was only seen in the ER at [facility].    Today: Seen today again at nursing request and to follow-up on labs and rash.  Rash is more extensive to the face and shoulder with dried crusting vesicles.  It is more involved  than was 2 days ago.  I spoke with her daughter who she brought her to the ER and she had washed her mom's hair that evening prior to bring her in and did not see any rash like.  Given this new information, suspicion for herpes zoster is higher.  Does states she had a rash a couple months ago but it was not anything has been now acting up to her R face and cheek.  Given that the patient has multiple comorbidities admitted to mental rash, will start renally dosed valacyclovir.  Also reviewed blood work-up for SIADH given her mildly low sodium which is improved today at 133. Urine sodium <20.  Eating drinking better and is more engaged with conversation, offering me more information during exam.     Past Medical History:   Diagnosis Date   ? Adult failure to thrive    ? PEDRO (acute kidney injury) (H)    ? Altered mental status    ? Chronic low back pain    ? CKD (chronic kidney disease)    ? Elevated serum creatinine    ? GERD (gastroesophageal reflux disease)    ? Lumbar canal stenosis    ? Milk alkali syndrome              Family History   Problem Relation Age of Onset   ? Alcohol abuse Father    ? Lung cancer Father    ? Diabetes Father    ? Depression Brother         suicide     Social History     Socioeconomic History   ? Marital status:      Spouse name: Not on file   ? Number of children: Not on file   ? Years of education: Not on file   ? Highest education level: Not on file   Occupational History   ? Not on file   Social Needs   ? Financial resource strain: Not on file   ? Food insecurity     Worry: Not on file     Inability: Not on file   ? Transportation needs     Medical: Not on file     Non-medical: Not on file   Tobacco Use   ? Smoking status: Former Smoker     Packs/day: 0.50     Types: Cigarettes   ? Smokeless tobacco: Never Used   Substance and Sexual Activity   ? Alcohol use: Yes   ? Drug use: Not on file   ? Sexual activity: Not on file   Lifestyle   ? Physical activity     Days per week: Not  "on file     Minutes per session: Not on file   ? Stress: Not on file   Relationships   ? Social connections     Talks on phone: Not on file     Gets together: Not on file     Attends Judaism service: Not on file     Active member of club or organization: Not on file     Attends meetings of clubs or organizations: Not on file     Relationship status: Not on file   ? Intimate partner violence     Fear of current or ex partner: Not on file     Emotionally abused: Not on file     Physically abused: Not on file     Forced sexual activity: Not on file   Other Topics Concern   ? Not on file   Social History Narrative   ? Not on file         Review of Systems   Constitutional: Negative.    HENT: Negative.    Eyes: Negative.    Respiratory: Negative.    Cardiovascular: Negative.    Endocrine: Negative.    Genitourinary: Negative.    Musculoskeletal: Positive for back pain.   Skin: Positive for rash.   Neurological: Positive for weakness. Negative for dizziness and headaches.   Hematological: Negative.    Psychiatric/Behavioral: Positive for agitation. Negative for behavioral problems.       Vitals:    01/28/21 1326   BP: 124/54   Pulse: 95   Resp: 16   Temp: 98.8  F (37.1  C)   SpO2: 96%   Weight: 102 lb 6.4 oz (46.4 kg)   Height: 5' 5\" (1.651 m)       Physical Exam  Constitutional:       Appearance: Normal appearance. She is not ill-appearing.   HENT:      Head: Atraumatic.   Eyes:      General: No scleral icterus.  Cardiovascular:      Rate and Rhythm: Normal rate and regular rhythm.   Pulmonary:      Effort: Pulmonary effort is normal.      Breath sounds: Normal breath sounds.   Abdominal:      General: There is no distension.      Palpations: Abdomen is soft.      Tenderness: There is no abdominal tenderness.   Musculoskeletal: Normal range of motion.         General: No swelling.   Skin:     Comments: Red, dry pustualar rash R neck   Neurological:      General: No focal deficit present.      Mental Status: She is " alert and oriented to person, place, and time.      Motor: Weakness present.      Gait: Gait normal.           LABS:   Reviewed.     ASSESSMENT:      ICD-10-CM    1. Anemia, unspecified type  D64.9    2. Herpes zoster without complication  B02.9    3. FTT (failure to thrive) in adult  R62.7    4. Hyponatremia  E87.1        PLAN:      Unilateral rash right neck line to the jaw and upper scapula.  Shingles:  pustular, dried over with obvious self excoriation.  Spoke with patient's daughter who says the rash was not nearly as extensive when she dropped her off at the ER less than a week ago.  She had washed her hair that day did not notice any of this rash on her face, jawline and said it was only a few pea-sized bumps underneath her hairline.  Given this information, and will treat as shingles despite it being greater than 72 hours, and is continuing to have symptoms and spread.   -We will start valacyclovir 1000 units p.o. daily x7 days.  Dosed for renal impairment.  -Continue mupirocin ointment twice daily.  -Please attempt to get into dermatology sooner than March 8.    Hyponatremia  Failure to thrive  PEDRO: I do think she has some hypovolemia however P.o. intake improving.  Urine sodium and serum osmolality was added to BMP today to check for SIADH, the osmolality was not done and the urine sodium was less than 20 which is likely not consistent with SIADH.  BMP also revealed improved sodium to 133.  This is consistent with her improved intake.  We will add a serum osmolality to Tuesday and recheck the BMP.      GERD: Denies any active symptoms.  Takes famotidine.    Low back pain and lumbosacral disease: Continues on tramadol which we can monitor use of.    Face to face time with patient as well as 10 minutes on phone with daughter for total >20 minutes face to face and >35 minutes spent on care and coordination of rash, obtaining history from daughter as well as discussing patients prior to admission home life and  baseline for ADLs and mentation.      Electronically signed by: Yareli Moore CNP

## 2021-06-21 NOTE — LETTER
Letter by Brown Gao MD at      Author: Brown Gao MD Service: -- Author Type: --    Filed:  Encounter Date: 1/27/2021 Status: (Other)         [facility]  [address] [place] [address]                                  January 27, 2021    Patient: Abeba Martinez   MR Number: [id number]   YOB: 1940   Date of Visit: 1/27/2021     Dear Dr. Alvarenga:    Thank you for referring Abeba Martinez to me for evaluation. Below are the relevant portions of my assessment and plan of care.    If you have questions, please do not hesitate to call me. I look forward to following Abeba along with you.    Sincerely,        Brown Gao MD          CC  No Recipients  Brown Gao MD  1/27/2021 10:02 PM  Signed   [facility]    Facility:  [facility] [[id number]]    Code Status:  DNR/DNI    Chief Complaint   Patient presents with   ? H & P   :                    Patient Active Problem List   Diagnosis   ? Hypercalcemia   ? PEDRO (acute kidney injury) (H)   ? Constipation   ? unilateral rash   ? FTT (failure to thrive) in adult       History:  Abeba Martinez  is an 80 year old female with history of CKD 3, GERD, milk-alkali syndrome, lumbosacral spine disease, chronic opiate dependent pain seen for admission to TCU     Hospital Course: Patient was never hospitalized.  Rather she was seen in the emergency room on January 21 and admitted directly to this facility.    Patient presented with failure to thrive with generalized weakness.  There was concern for community-acquired pneumonia with possible left lower lobe infiltrate leading to community-acquired antibiotic treatment.    There is also a focus on her rash which has been present for months and is quite distinctive.    Provider and patient's family were concerned about her safety, and she reluctantly agreed to come to the  "facility here.    Subjective/ROS:    -augmented by discussion with facility staff involved in direct care      -Patient regrets her decision to come to the facility.  She does not like it here.  She wants to leave and go home as soon as possible.  -Patient's distinctive rash started probably in the late fall or early winter.  She thinks at least 2 months ago.  She described getting \"little bumps\" which were very itchy.  She scratched them and there was some weeping.  She says it never really hurt but was just very very itchy.  This is led to a lot of scratching and the rash is never gone away.  She says that now she has some discomfort with the rash but nothing severe or that she would seek attention for.  No known history of zoster vaccination.  -Hyponatremia noted and worsening.  She has been on tramadol but for at least a year.  She generally takes 1 or 2 a day.  She knows she can have up to 3 a day.  -She says she takes the tramadol for low back pain.  She says she has disc disease L3-4-5.  She has some radiation into the upper part of her right leg when she is up and about.  She has had epidural injections in the past though quite a while ago.  She does have a history with [place] [facility] but she is not sure she is going to keep it if she is decided against surgery.  -Patient's weight has been stable over the last year or so.  Patient reports no headaches change in vision speaking swallowing hearing nausea vomiting diarrhea melena bright red blood per rectum dysuria fever sweats chills.  She did lose her strength in the bathroom here last night but did not fall to the floor.  She was able to lower herself onto the toilet.  She denies recent falls at home as well.  No dysuria.  She describes her mood is good.  Remainder negative    Past Medical History:   Diagnosis Date   ? Adult failure to thrive    ? PEDRO (acute kidney injury) (H)    ? Altered mental status    ? Chronic low back pain    ? CKD (chronic kidney " disease)    ? Elevated serum creatinine    ? GERD (gastroesophageal reflux disease)    ? Lumbar canal stenosis    ? Milk alkali syndrome      Past Surgical History:   Procedure Laterality Date   ? APPENDECTOMY     ? CATARACT EXTRACTION, BILATERAL     ? VEIN LIGATION AND STRIPPING            Family History   Problem Relation Age of Onset   ? Alcohol abuse Father    ? Lung cancer Father    ? Diabetes Father    ? Depression Brother         suicide   :       Social History     Socioeconomic History   ? Marital status:      Spouse name: Not on file   ? Number of children: Not on file   ? Years of education: Not on file   ? Highest education level: Not on file   Occupational History   ? Not on file   Social Needs   ? Financial resource strain: Not on file   ? Food insecurity     Worry: Not on file     Inability: Not on file   ? Transportation needs     Medical: Not on file     Non-medical: Not on file   Tobacco Use   ? Smoking status: Former Smoker     Packs/day: 0.50     Types: Cigarettes   ? Smokeless tobacco: Never Used   Substance and Sexual Activity   ? Alcohol use: Yes   ? Drug use: Not on file   ? Sexual activity: Not on file   Lifestyle   ? Physical activity     Days per week: Not on file     Minutes per session: Not on file   ? Stress: Not on file   Relationships   ? Social connections     Talks on phone: Not on file     Gets together: Not on file     Attends Jain service: Not on file     Active member of club or organization: Not on file     Attends meetings of clubs or organizations: Not on file     Relationship status: Not on file   ? Intimate partner violence     Fear of current or ex partner: Not on file     Emotionally abused: Not on file     Physically abused: Not on file     Forced sexual activity: Not on file   Other Topics Concern   ? Not on file   Social History Narrative   ? Not on file   :    Patient lives in [place].  She says she has a son that lives within a few blocks of  her.  She has 3 other children.  She says that she no longer drives but she knows how to order her food which is delivered to her house.  She says she used to go out walking downtown but with the pandemic is now been close to a year since she has been able to do that.    Current Outpatient Medications on File Prior to Visit   Medication Sig Dispense Refill   ? famotidine (PEPCID) 40 MG tablet Take 40 mg by mouth daily.     ? lansoprazole (PREVACID) 30 MG capsule Take 30 mg by mouth 2 (two) times a day.     ? traMADoL (ULTRAM) 50 mg tablet Take 50 mg by mouth every 6 (six) hours as needed for pain.     ? traZODone (DESYREL) 150 MG tablet Take 150 mg by mouth at bedtime.      ? triamcinolone (KENALOG) 0.1 % cream Apply topically 2 (two) times a day.       No current facility-administered medications on file prior to visit.    :      ALLERGIES:  Patient has no known allergies.    Vitals:      Vital signs: Reviewed per facility EMR vitals including as follows:              125/54 respirations 16 temperature 98.8 pulse 95 O2 sats 97% weight is 102 pounds      Physical exam:    Patient is alert oriented x3 conversant with fluent speech.  She answers questions easily.  She follows commands easily.  Normocephalic/atraumatic sclera clear nonicteric gaze is conjugate oropharynx is clear neck is supple with good range of motion she has good range of motion of all 4 extremities.  She has no edema.  Her heart is tacky but regular in the 90s distant S1-S2 without murmur gallop or rub lungs are clear to auscultation abdomen is soft without again a megaly mass or tenderness skin is warm and dry except as below:  Patient has heavily excoriated scabbed skin which is exactly unilateral.  It is from the spinal processes rightward toward the tip of the chin.  There is absolutely no rash on the left side.  It is fairly sharply demarcated but is not isolated to a single dermatome.  Rather it is C3 and 4 and perhaps C5 with some anterior  "shoulder involvement as well.  At this point it is so heavily excoriated that it is lichenified between scabs.  There are no \"bumps\" which she saw when it first erupted sometime ago.  The rash again is fairly sharply demarcated on the upper margin from the tip of her chin to the earlobe spreads down onto the neck and onto the anterior shoulder.  During our time together I can see her rubbing or scratching it lightly when she is not even aware of doing so.      Due to the 2020 Covid 19 pandemic, except as noted above, the patient was visually observed at a 6 foot plus distance.  An observational exam was performed in an effort to keep patient safe from Covid 19 and other communicable diseases.   Labs:  Lab Results   Component Value Date    WBC 6.2 01/26/2021    HGB 9.4 (L) 01/26/2021    HCT 28.4 (L) 01/26/2021    MCV 79 (L) 01/26/2021     01/26/2021     Results for orders placed or performed in visit on 01/26/21   Basic Metabolic Panel   Result Value Ref Range    Sodium 131 (L) 136 - 145 mmol/L    Potassium 4.1 3.5 - 5.0 mmol/L    Chloride 99 98 - 107 mmol/L    CO2 23 22 - 31 mmol/L    Anion Gap, Calculation 9 5 - 18 mmol/L    Glucose 97 70 - 125 mg/dL    Calcium 9.1 8.5 - 10.5 mg/dL    BUN 37 (H) 8 - 28 mg/dL    Creatinine 1.83 (H) 0.60 - 1.10 mg/dL    GFR MDRD Af Amer 32 (L) >60 mL/min/1.73m2    GFR MDRD Non Af Amer 26 (L) >60 mL/min/1.73m2         Lab Results   Component Value Date    TSH 1.09 08/26/2019     No results found for: HGBA1C  [unfilled]  Lab Results   Component Value Date    HIPGLYUK69 361 08/26/2019     No results found for: BNP  [unfilled]        Invalid input(s): PRINTERVAL       Complete Blood Count -no Diff (01/21/2021 11:44 AM CST)  Complete Blood Count -no Diff (01/21/2021 11:44 AM CST)   Component Value Ref Range Performed At [facility]   WBC 5.5 3.5 - 10.5 x10(9)/L [facility]     RBC 3.62 (L) 3.90 - 5.03 x10(12)/L [facility]     Hemoglobin 9.5 (L) 12.0 - 15.5 " g/dL [facility]     HCT 29.8 (L) 34.9 - 44.5 % [facility]     MCV 82.3 80.0 - 100.0 fL [facility]     MCH 26.2 (L) 27.6 - 33.3 pg [facility]     MCHC 31.9 31.5 - 35.2 g/dL [facility]     RDW 15.9 (H) 11.9 - 15.5 % [facility]     Platelets 189 150 - 450 x10(9)/L [facility]     Automated NRBC 0 <=0 /100 WBC [facility]       Complete Blood Count -no Diff (01/21/2021 11:44 AM CST)   Specimen   Blood     Complete Blood Count -no Diff (01/21/2021 11:44 AM CST)   Performing Organization Address City/State/ZIP Code Phone Number   [address] [address] [phone]     Back to top of Lab Results       Basic Metabolic Panel (01/21/2021 11:44 AM CST)  Basic Metabolic Panel (01/21/2021 11:44 AM CST)   Component Value Ref Range Performed At Pathologist Signature   Sodium 133 (L) 136 - 145 mmol/L [facility]     Potassium 4.3 3.5 - 5.1 mmol/L [facility]     Chloride 99 98 - 109 mmol/L [facility]     CO2 25 20 - 29 mmol/L [facility]     Anion Gap 9 7 - 16 mmol/L [facility]     Calcium 11.1 (H) 8.4 - 10.4 mg/dL [facility]     BUN 37 (H) 7 - 26 mg/dL [facility]     Creatinine 1.66 (H) 0.55 - 1.02 mg/dL [facility]     GFR, Estimated 28 (L) >60 mL/min/1.73m2 [facility]     Glucose 111 (H)Comment: The given reference range is for the fasting state. Non-fasting reference range for glucose is 70 - 180 mg/dL. 70 - 100 mg/dL [facility]        Assessment/Plan:      ICD-10-CM    1. unilateral rash  R21    2. FTT (failure to thrive) in adult  R62.7        Failure to thrive  Generalized weakness   Etiology is unclear.  Pneumonia has been suspected and she has received treatment for it.  It sounds like the pandemic has not done her any good with isolation and decreased activity.  -PT, OT,  involvement.  Dietitian consulted.  Cognitive assessment will be appropriate as  "well.    Community-acquired pneumonia   Has completed her antibiotic therapy.  \".   Result Narrative   EXAM: XR PORTABLE CHEST 1 VIEW  LOCATION: [facility]  DATE/TIME: 1/21/2021 12:35 PM    INDICATION: Generalized weakness  COMPARISON: 04/04/2020    IMPRESSION: Mild nasal predominant interstitial infiltrate. Stable biapical scarring. Heart size is normal. No pleural effusions. Atherosclerotic change of aorta. Marked arthritic change in both shoulders.   Other Result Information   Interface, In Rad Results - 01/21/2021 12:46 PM CST  EXAM: XR PORTABLE CHEST 1 VIEW  LOCATION: [facility]  DATE/TIME: 1/21/2021 12:35 PM    INDICATION: Generalized weakness  COMPARISON: 04/04/2020    IMPRESSION: Mild nasal predominant interstitial infiltrate. Stable biapical scarring. Heart size is normal. No pleural effusions. Atherosclerotic change of aorta. Marked arthritic change in both shoulders.     -No respiratory symptoms at this time.  No additional work-up or treatment anticipated.    GERD   Patient reports that she was hospitalized for this a year ago and has been on PPI since without any recurrent problems.  She also takes Pepcid.  Unclear if she needs both lifelong but I did not make any changes today.    Rash   The most distinctive feature is its completely unilateral distribution.  Another distinctive feature is relatively sharp smooth demarcation especially at the upper margin from the tip of the chin to the lobe of the ear.  The next feature is heavy excoriation including multiple scabs of all sorts of different sizes and shapes.  The next feature is lichenification especially in the anterior shoulder and on top of the shoulder.  There is very mild erythema here but I do not think we are dealing with secondary bacterial infection that we need to watch for that    Patient continues to have a tingling itching sensation which could be a variation of postherpetic neuralgia.    Despite the unusual story, I think " shingles with postherpetic neuralgia and unrelenting itch scratch cycle is a distinct possibility here.  When she describes the early part of the rash it does sound like she was dealing with small blisters that did weep.  It is in adjacent dermatomes C3-4 and probably 5 which is not rare though atypical.    Other unusual unilateral rashes exist but seem less likely based on what we can see and hear about the original presentation.  -Have asked the staff to try to move her dermatology appointment up from the March 8 date.  -No expected benefit to antiviral therapy at this point.  -Postherpetic neuralgia treatment with gabapentin could be an option although she is not having any pain is just more of an itching sensation.  Nonetheless if we cannot stop the itch scratch cycle it may be worth a try.  We will see what dermatology has to say as well.  -Continue topical steroid and Bactroban therapy as current  -Discussed itch scratch cycle and the need to leave the rash alone  -Continue clinical monitoring for signs of secondary bacterial infection    Chronic opiate dependent pain  Lumbosacral spine disease 3-4-5   I do not have primary documentation about the lumbar disease.  She says it is quite chronic and has not changed.  She feels she really needs to stay on the tramadol though we could offer alternative opiate if we do need to stop it for the hyponatremia which is reassuring to her.  I did not stop it today.    Hyponatremia   She has a history of normal sodiums until January 21 in the emergency room at 133.  She is now down to 131.  Her creatinine has increased from 1.66-1.83.    I do not know the cause of her hyponatremia at this time.  I think hypovolemic hyponatremia is a strong consideration especially with her worsening renal function.  Development of SIADH is always a consideration as well.  -Urine sodium urine osmolarity serum osmolarity ordered  -We will repeat BMP with the above labs.  If labs not  consistent with SIADH, saline challenge could be done even here in the facility.  If looking SIADH like, would need to consider fluid restriction etc.  Might need more investigation for underlying cause as well.  -Consider tramadol suspect but just seems unlikely since he has been on it for a year.    CKD 3   Appears to be the case based on prior creatinine readings.  Indeed she is even been into the CKD 4 range at times although that appears to be with hospitalization, and more likely reflects some acute kidney injury at that time in April 2020.    Other   Added MiraLAX for bowels if need be    Insomnia   Takes trazodone 150 mg at bedtime    Dissatisfaction with facility   Patient really does not want to be here.  She agrees to stay for assessments today but she says she intends to talk with her daughter about getting out as soon as possible          Case discussed with:    Facility staff             Brown Gao MD
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
## 2021-06-21 NOTE — LETTER
Letter by Laura Torres NP at      Author: Laura Torres NP Service: -- Author Type: --    Filed:  Encounter Date: 2/2/2021 Status: (Other)         [facility]  [address] [place] [address]                                  February 15, 2021    Patient: Abeba Martinez   MR Number: [id number]   YOB: 1940   Date of Visit: 2/2/2021     Dear Dr. Alvarenga:    Thank you for referring Abeba Martinez to me for evaluation. Below are the relevant portions of my assessment and plan of care.    If you have questions, please do not hesitate to call me. I look forward to following Abeba along with you.    Sincerely,        Laura Torres NP          CC  No Recipients  Laura Torres NP  2/15/2021 10:05 PM  Sign when Signing Visit  [facility]    Facility:   [facility] [[id number]]   Code Status: FULL CODE  PCP: Nicanor Juarez MD   Phone: [phone]   Fax: [phone]      CHIEF COMPLAINT/REASON FOR VISIT:  Chief Complaint   Patient presents with   ? Discharge Summary       HISTORY COURSE:  Per Dr. Gao's admission visit on 1/27/2021:  Hospital Course: Patient was never hospitalized.  Rather she was seen in the emergency room on January 21 and admitted directly to this facility.    Patient presented with failure to thrive with generalized weakness.  There was concern for community-acquired pneumonia with possible left lower lobe infiltrate leading to community-acquired antibiotic treatment.    There is also a focus on her rash which has been present for months and is quite distinctive.    Provider and patient's family were concerned about her safety, and she reluctantly agreed to come to the facility here.    Subjective/ROS:    -augmented by discussion with facility staff involved in direct care      -Patient regrets her decision to come to the facility.  She does not like it here.  She wants to leave and go home  "as soon as possible.  -Patient's distinctive rash started probably in the late fall or early winter.  She thinks at least 2 months ago.  She described getting \"little bumps\" which were very itchy.  She scratched them and there was some weeping.  She says it never really hurt but was just very very itchy.  This is led to a lot of scratching and the rash is never gone away.  She says that now she has some discomfort with the rash but nothing severe or that she would seek attention for.  No known history of zoster vaccination.  -Hyponatremia noted and worsening.  She has been on tramadol but for at least a year.  She generally takes 1 or 2 a day.  She knows she can have up to 3 a day.  -She says she takes the tramadol for low back pain.  She says she has disc disease L3-4-5.  She has some radiation into the upper part of her right leg when she is up and about.  She has had epidural injections in the past though quite a while ago.  She does have a history with [facility] but she is not sure she is going to keep it if she is decided against surgery.  -Patient's weight has been stable over the last year or so.  Patient reports no headaches change in vision speaking swallowing hearing nausea vomiting diarrhea melena bright red blood per rectum dysuria fever sweats chills.  She did lose her strength in the bathroom here last night but did not fall to the floor.  She was able to lower herself onto the toilet.  She denies recent falls at home as well.  No dysuria.  She describes her mood is good.  Remainder negative    Today's visit:  Abeba is an 80y.o. woman  has a past medical history of Adult failure to thrive, PEDRO (acute kidney injury) (H), Altered mental status, Chronic low back pain, CKD (chronic kidney disease), Elevated serum creatinine, GERD (gastroesophageal reflux disease), Lumbar canal stenosis, and Milk alkali syndrome. Patient seen today for discharge visit in TCU. She is to discharge home on 2/6/2021 with PT " from [facility]. Patient notes she is looking forward to discharge home. She is concerned about pain control at home - she notes tramadol does work, but her PCP does not want her to be on this - she states he had previously mentioned treatment of her pain with Gabapentin - we discuss this more about the difference between opioids and neuropathic pain medications - she would like to try Gabapentin prior to discharge as this would allow her to assess efficacy and safety prior to going home - then be able to follow-up with Dr Hamilton for adjustments as needed. Therapies are going well - she is going to discharge with ongoing therapies. Appetite continues to improve - she is ready for food at home. She is sleeping well. Denies CP, palpitations, fatigue, nausea, vomiting, increased SOB/GONZALES, fever, chills, and/or b/b concerns today.    PHYSICAL EXAM:   GENERAL APPEARANCE:  Alert, in no distress, appears healthy, oriented, thin, cooperative, elderly woman in room  EYES:  EOM, conjunctivae, lids, pupils and irises normal  NECK:  No adenopathy,masses or thyromegaly  RESP:  respiratory effort and palpation of chest normal, lungs clear to auscultation , no respiratory distress  CV:  Palpation and auscultation of heart done , regular rate and rhythm, no murmur, rub, or gallop, no edema, +2 pedal pulses  ABDOMEN:  normal bowel sounds, soft, nontender, no hepatosplenomegaly or other masses  M/S:   Gait and station abnormal - uses RW  Digits and nails abnormal - arthritic changes present  SKIN:  Palpation of skin and subcutaneous tissue baseline, Inspection of skin and subcutaneous tissue abnormal, rash present, type: multiple areas of rash with wounds of varying degress of healing - no new areas or advancement noted, appearance: red, location: left, lateral neck  NEURO:   Cranial nerves 2-12 are normal tested and grossly at patient's baseline  PSYCH:  oriented X 3, normal insight, judgement and memory, affect and mood  normal, anxious    MEDICATION LIST:  Current Outpatient Medications   Medication Sig   ? famotidine (PEPCID) 40 MG tablet Take 40 mg by mouth daily.   ? lansoprazole (PREVACID) 30 MG capsule Take 30 mg by mouth 2 (two) times a day.   ? traMADoL (ULTRAM) 50 mg tablet Take 50 mg by mouth every 6 (six) hours as needed for pain.   ? traZODone (DESYREL) 150 MG tablet Take 150 mg by mouth at bedtime.    ? triamcinolone (KENALOG) 0.1 % cream Apply topically 2 (two) times a day.     Labs:  Reviewed in EPIC    DISCHARGE DIAGNOSIS:  Failure to thrive  Generalized weakness  Etiology is unclear.  Pneumonia has been suspected and she has received treatment for it.  It sounds like the pandemic has not done her any good with isolation and decreased activity. She is looking forward to returning home with ongoing therapies for strengthening.   -Follow-up with PCP for ongoing monitoring, management and future interventions as needed.   -PT/OT through home care - adv per their recommendations      Community-acquired pneumonia  Has completed her antibiotic therapy. No respiratory symptoms at this time. No additional work-up or treatment anticipated.  -Follow-up with PCP for ongoing monitoring    GERD  Patient reports that she was hospitalized for this a year ago and has been on PPI since without any recurrent problems.  She also takes Pepcid.  Unclear if she needs both lifelong but no changes made while in TCU.   -Continue medications as ordered  -Follow-up with PCP for ongoing monitoring and management    Rash  Suspected herpes zoster without complication  The most distinctive feature is its completely unilateral distribution.  Another distinctive feature is relatively sharp smooth demarcation especially at the upper margin from the tip of the chin to the lobe of the ear.  The next feature is heavy excoriation including multiple scabs of all sorts of different sizes and shapes.  The next feature is lichenification especially in the  anterior shoulder and on top of the shoulder.  There is very mild erythema noted - but no ongoing concern for secondary infection. Did start on regimen of Acyclovir for concerns that this was advanced Shingles - no furthering of rash noted, mild improvement with daily wound care; resident does note associated itching and headaches with rash - feels gabapentin may help this as well as discussed above.  -Follow-up with dermatology scheduled for 3/11/2021  -Continues on Valacyclovir at this time; effectiveness to be determined by PCP with follow-up visit      Chronic opiate dependent pain  Lumbar foramanal stenosis  Chronic LBP with sciatica  Patient notes LBP is quite chronic and has not changed.  Discussion re:Tramadol and her concerns following discharge as noted above, she does not feel her PCP will continue this for her and she would prefer a medication he would be okay with.   -Continue PRN Tramadol, will discharge with limited prescription  -Start Gabapentin 100mg PO at bedtime - assess for ability to increase with resident safety prior to discharge.  -Follow-up with PCP for ongoing monitoring and management.    Hyponatremia  She has a history of normal sodiums until January 21 in the emergency room at 133.  She is now down to 131. Her creatinine has increased from 1.66-1.83. Thought to be  hypovolemic hyponatremia is a strong consideration especially with her worsening renal function; Development of SIADH is always a consideration as well. Urine sodium osmolarity completed in TCU WNL. Consider tramadol suspect but just seems unlikely since he has been on it for a year.  -Ongoing monitoring and management per PCP    CKD 3  Appears to be the case based on prior creatinine readings.  Indeed she is even been into the CKD 4 range at times although that appears to be with hospitalization, and more likely reflects some acute kidney injury at that time in April 2020.  -Follow-up with PCP for ongoing  monitoring.    Insomnia  Takes trazodone 150 mg at bedtime  -Continue as ordered  -Follow-up with PCP for ongoing monitoring and management; may need reduction with initiation of Gabapentin.    Anemia  Noted on today's lab work with a drop if her Hgb from 8.4 on 1/27/2021 to 7.4 today. No acute s/sx of bleeding. Patient denies presence of lightheadedness, increased fatigue, dizziness.   -Protonix 20mg PO two times a day  -Guaiac stools x3  -Hgb 2/3/2021 for recheck  -Have asked nursing to schedule follow-up visit with her PCP to ensure appropriate follow-up and monitoring of this versus holding residents discharge with her being hemodynamically stable.      MEDICAL EQUIPMENT NEEDS:  None    DISCHARGE PLAN/FACE TO FACE:  I certify that services are/were furnished while this patient was under the care of a physician and that a physician or an allowed non-physician practitioner (NPP), had a face-to-face encounter that meets the physician face-to-face encounter requirements. The encounter was in whole, or in part, related to the primary reason for home health. The patient is confined to his/her home and needs intermittent skilled nursing, physical therapy, speech-language pathology, or the continued need for occupational therapy. A plan of care has been established by a physician and is periodically reviewed by a physician.  Date of Face-to-Face Encounter: 02/02/2021    My clinical findings support the need for the above services because: Physical Therapy Services are needed to assess and treat the following functional impairments: physical limitation 2/2 above noted diagnoses.    Further, I certify that my clinical findings support that this patient is homebound (i.e. absences from home require considerable and taxing effort and are for medical reasons or Judaism services or infrequently or of short duration when for other reasons) because: Is unable to walk greater than 60 feet without rest.      The patient is, or  has been, under my care and I have initiated the establishment of the plan of care. This patient will be followed by a physician who will periodically review the plan of care.    Schedule follow up visit with primary care provider within 7 days to reestablish care.    Electronically signed by:   LEONARDO Woodson, TESSA  [facility]  [place] Office: [address] [place] [address] [address] [place] [address]   [place] Cell: [phone]  [place] Fax: [phone]    [facility] Offce: [address] [place] [address]  [facility] Phone: [phone]  [facility] Voicemail: [phone]

## 2021-06-21 NOTE — LETTER
Letter by Yareli Moore CNP at      Author: Yareli Moore CNP Service: -- Author Type: --    Filed:  Encounter Date: 4/29/2021 Status: (Other)         [facility]  [address] [place] [address]                                  May 7, 2021    Patient: Abeba Martinez   MR Number: [id number]   YOB: 1940   Date of Visit: 4/29/2021     Dear Dr. Alvarenga:    Thank you for referring Abeba Martinez to me for evaluation. Below are the relevant portions of my assessment and plan of care.    If you have questions, please do not hesitate to call me. I look forward to following Abeba along with you.    Sincerely,        Yareli Moore CNP          CC  No Recipients  Yareli Moore CNP  5/7/2021  7:19 PM  Sign when Signing Visit  [facility]    Facility:   [facility] [[id number]]   Code Status: POLST AVAILABLE      CHIEF COMPLAINT/REASON FOR VISIT:  Chief Complaint   Patient presents with   ? Hospital Visit Follow Up     Weakness, FTT       HISTORY:      HPI: Abeba is a 80 y.o. female history of CKD, GERD, low back pain and opioid dependence who is known to this facility from previous admission in February 2021.   She presented to [facility] ED with weakness and R ear pain with resolving shingles rash. Her daughter is concerned that she is failing at home and will need long term and this time Abeba agrees with this. She admits to increasing difficulty cooking and dressing herself and not eating much. She is eating and drinking well in the TCU.   Her CT scan of head was negative for acute process in the ED. Pain likley post herpetic pain.    She had a mild PEDRO in the ED.   She was started on mirtazipine for appetite. She is also on nutrition supplements.   She is sitting up on the side of the bed today and has some c/o discomfort on the R side of her neck where her previous shingles outbreak occurred. She is very  pleasant and has notably improved mood and willingness to stay in the facility.     Past Medical History:   Diagnosis Date   ? Adult failure to thrive    ? PEDRO (acute kidney injury) (H)    ? Altered mental status    ? Chronic low back pain    ? CKD (chronic kidney disease)    ? Elevated serum creatinine    ? GERD (gastroesophageal reflux disease)    ? Lumbar canal stenosis    ? Milk alkali syndrome              Family History   Problem Relation Age of Onset   ? Alcohol abuse Father    ? Lung cancer Father    ? Diabetes Father    ? Depression Brother         suicide     Social History     Socioeconomic History   ? Marital status:      Spouse name: Not on file   ? Number of children: Not on file   ? Years of education: Not on file   ? Highest education level: Not on file   Occupational History   ? Not on file   Social Needs   ? Financial resource strain: Not on file   ? Food insecurity     Worry: Not on file     Inability: Not on file   ? Transportation needs     Medical: Not on file     Non-medical: Not on file   Tobacco Use   ? Smoking status: Former Smoker     Packs/day: 0.50     Types: Cigarettes   ? Smokeless tobacco: Never Used   Substance and Sexual Activity   ? Alcohol use: Yes   ? Drug use: Not on file   ? Sexual activity: Not on file   Lifestyle   ? Physical activity     Days per week: Not on file     Minutes per session: Not on file   ? Stress: Not on file   Relationships   ? Social connections     Talks on phone: Not on file     Gets together: Not on file     Attends Confucianism service: Not on file     Active member of club or organization: Not on file     Attends meetings of clubs or organizations: Not on file     Relationship status: Not on file   ? Intimate partner violence     Fear of current or ex partner: Not on file     Emotionally abused: Not on file     Physically abused: Not on file     Forced sexual activity: Not on file   Other Topics Concern   ? Not on file   Social History Narrative  "  ? Not on file         Review of Systems   Constitutional: Negative.    HENT: Negative.    Eyes: Negative.    Respiratory: Negative.    Cardiovascular: Negative.    Endocrine: Negative.    Genitourinary: Negative.    Musculoskeletal: Positive for back pain.   Skin: Negative for rash.   Neurological: Positive for weakness. Negative for dizziness and headaches.   Hematological: Negative.    Psychiatric/Behavioral: Negative for behavioral problems.       Vitals:    04/29/21 1150   BP: 152/65   Pulse: (!) 102   Resp: 18   Temp: 98  F (36.7  C)   SpO2: 99%   Weight: 106 lb 12.8 oz (48.4 kg)   Height: 5' 5\" (1.651 m)       Physical Exam  Constitutional:       Appearance: Normal appearance. She is not ill-appearing.   HENT:      Head: Atraumatic.   Eyes:      General: No scleral icterus.  Cardiovascular:      Rate and Rhythm: Normal rate and regular rhythm.   Pulmonary:      Effort: Pulmonary effort is normal.      Breath sounds: Normal breath sounds.   Abdominal:      General: There is no distension.      Palpations: Abdomen is soft.      Tenderness: There is no abdominal tenderness.   Musculoskeletal: Normal range of motion.         General: No swelling.   Neurological:      General: No focal deficit present.      Mental Status: She is alert and oriented to person, place, and time.      Motor: Weakness present.      Gait: Gait normal.           LABS:   Reviewed.     ASSESSMENT:      ICD-10-CM    1. Chronic midline low back pain with sciatica, sciatica laterality unspecified  M54.40     G89.29    2. Post herpetic neuralgia  B02.29    3. Moderate episode of recurrent major depressive disorder (H)  F33.1        PLAN:      Unilateral post herpetic neurologia:   -Gabapentin 300mg po two times a day.   -Tylenol three times a day.   Tylenol 650mg po q noc prn .     Hyponatremia  Failure to thrive  PEDRO: Eating and drinking well here.   -Recheck BMP on Tuesday.   -Remeron 7.5mg po at bedtime.   -Multivitamin 1 tab daily. "     Major depression: Mood is notably improved compared to last admission in February. Smiling and conversational.   -Trazodone 150mg po at bedtime.     GERD: Denies any active symptoms.  Takes famotidine.         Electronically signed by: Yareli Moore CNP

## 2021-06-21 NOTE — LETTER
Letter by Yareli Moore CNP at      Author: Yareli Moore CNP Service: -- Author Type: --    Filed:  Encounter Date: 1/26/2021 Status: (Other)         [facility]  [address] [place] [address]                                  February 9, 2021    Patient: Abeba Martinez   MR Number: [id number]   YOB: 1940   Date of Visit: 1/26/2021     Dear Dr. Alvarenga:    Thank you for referring Abeba Martinez to me for evaluation. Below are the relevant portions of my assessment and plan of care.    If you have questions, please do not hesitate to call me. I look forward to following Abeba along with you.    Sincerely,        Yareli Moore CNP          CC  No Recipients  Yareli Moore CNP  2/9/2021  9:38 PM  Sign when Signing Visit  [facility]    Facility:   [facility] [[id number]]   Code Status: POLST AVAILABLE      CHIEF COMPLAINT/REASON FOR VISIT:  Chief Complaint   Patient presents with   ? Problem Visit     rash       HISTORY:      HPI: Abeba is a 80 y.o. female history of CKD, GERD, low back pain and opioid dependence who was seen in the emergency room with failure to thrive and generalized weakness.  She was thought to have had a community-acquired pneumonia and is being treated for that although she says that she does not have pneumonia.  She typically lives at home in her own apartment and her daughter stops by every couple weeks to help with showering and groceries.  She is limited in her history and does not want to engage much in conversation, rest of her history is obtained through chart review which is also minimal if she did not have a hospitalization and was only seen in the ER at [facility].    Today: Seen per nursing request for concerns of red, pustular rash with dried drainage, patient reports extensive itching and appears she has obvious self excoriation.  She reports this is been  here for a couple months however the reliability of this timeline is questionable.  She reports it only is itching but denies pain.  She was seen in the ER at [facility] last week and they diagnosis is probable psoriasis, however she has no history of this and no family history.  She denies any fever, body aches or pains.  It is mostly unilateral and shingles could be suspected.  She is clearly been scratching this and I am concerned for secondary infection so we will add topical antibiotic ointment and attempt to move her dermatology appointment up to a sooner date.  Current date March 8.  She is otherwise improving her p.o. intake and is a little bit more talkative and open today when she was yesterday.  She is compliant with therapies and is not asking to leave as much for nursing.    Past Medical History:   Diagnosis Date   ? Adult failure to thrive    ? PEDRO (acute kidney injury) (H)    ? Altered mental status    ? Chronic low back pain    ? CKD (chronic kidney disease)    ? Elevated serum creatinine    ? GERD (gastroesophageal reflux disease)    ? Lumbar canal stenosis    ? Milk alkali syndrome              Family History   Problem Relation Age of Onset   ? Alcohol abuse Father    ? Lung cancer Father    ? Diabetes Father    ? Depression Brother         suicide     Social History     Socioeconomic History   ? Marital status:      Spouse name: Not on file   ? Number of children: Not on file   ? Years of education: Not on file   ? Highest education level: Not on file   Occupational History   ? Not on file   Social Needs   ? Financial resource strain: Not on file   ? Food insecurity     Worry: Not on file     Inability: Not on file   ? Transportation needs     Medical: Not on file     Non-medical: Not on file   Tobacco Use   ? Smoking status: Former Smoker     Packs/day: 0.50     Types: Cigarettes   ? Smokeless tobacco: Never Used   Substance and Sexual Activity   ? Alcohol use: Yes   ? Drug use: Not  "on file   ? Sexual activity: Not on file   Lifestyle   ? Physical activity     Days per week: Not on file     Minutes per session: Not on file   ? Stress: Not on file   Relationships   ? Social connections     Talks on phone: Not on file     Gets together: Not on file     Attends Scientologist service: Not on file     Active member of club or organization: Not on file     Attends meetings of clubs or organizations: Not on file     Relationship status: Not on file   ? Intimate partner violence     Fear of current or ex partner: Not on file     Emotionally abused: Not on file     Physically abused: Not on file     Forced sexual activity: Not on file   Other Topics Concern   ? Not on file   Social History Narrative   ? Not on file         Review of Systems   Constitutional: Negative.    HENT: Negative.    Eyes: Negative.    Respiratory: Negative.    Cardiovascular: Negative.    Endocrine: Negative.    Genitourinary: Negative.    Musculoskeletal: Positive for back pain.   Skin: Positive for rash.   Neurological: Positive for weakness. Negative for dizziness and headaches.   Hematological: Negative.    Psychiatric/Behavioral: Positive for agitation. Negative for behavioral problems.       Vitals:    01/26/21 1551   BP: 109/74   Pulse: 92   Resp: 16   Temp: 97.5  F (36.4  C)   SpO2: 100%   Weight: 112 lb 6.4 oz (51 kg)   Height: 5' 5\" (1.651 m)       Physical Exam  Constitutional:       Appearance: Normal appearance. She is not ill-appearing.   HENT:      Head: Atraumatic.   Eyes:      General: No scleral icterus.  Cardiovascular:      Rate and Rhythm: Normal rate and regular rhythm.   Pulmonary:      Effort: Pulmonary effort is normal.      Breath sounds: Normal breath sounds.   Abdominal:      General: There is no distension.      Palpations: Abdomen is soft.      Tenderness: There is no abdominal tenderness.   Musculoskeletal: Normal range of motion.         General: No swelling.   Skin:     Comments: Red, dry pustualar " "rash R neck   Neurological:      General: No focal deficit present.      Mental Status: She is alert and oriented to person, place, and time.      Motor: Weakness present.      Gait: Gait normal.           LABS:   Reviewed.     ASSESSMENT:      ICD-10-CM    1. unilateral rash  R21    2. FTT (failure to thrive) in adult  R62.7        PLAN:      Unilateral rash right neck line to the jaw and upper scapula.  Pustular, dried over with obvious self excoriation.  Patient reports this is been here for \"probably for months\", I am unsure of her reliability in regards to this timeline.  Is some of the areas do look like possible shingles vesicles, however it is quite advanced.  She recalls that itching but denies any burning or pain.  -Continue triamcinolone Twice daily for itching.  -Add mupirocin ointment twice daily.  -Please attempt to get into dermatology sooner than March 8.    Failure to thrive  PEDRO: I do think she has some hypovolemia and poor p.o. intake, will monitor her labs and recheck on Thursday to monitor kidney function.  P.o. intake improving.  Cooperative with therapies.  -BMP on Thursday.    GERD: Denies any active symptoms.  Takes famotidine.    Low back pain and lumbosacral disease: Continues on tramadol which we can monitor use of.      Electronically signed by: Yareli Moore CNP         "

## 2021-06-25 NOTE — PROGRESS NOTES
[facility]    Facility:   [facility] [[id number]]   Code Status: DNR/DNI     To whom it may concern:    I have been caring for Abeba Martinez since 4/27/2021 at [facility] as her Primary Care Provider following a recent hospitalization. During this time she has been working with Physical Therapy and has been deemed unsafe to reside independently in her own residence due to high fall risk, limited safety awareness and fatigue. Please allow her and her family out of her current lease so she may transition to a safer living environment. Thank you.     Electronically signed by:   LEONARDO Woodson, TESSA  [place] [facility]  Office: [facility] Office: [address] [place] [address]    [place] Cell: [phone]  [place] Fax: [phone]    [facility] Phone: [phone]  [facility] Voicemail: [phone]

## 2021-06-26 NOTE — PROGRESS NOTES
[facility]    Facility:   [facility] [[id number]]   Code Status: DNR/DNI      CHIEF COMPLAINT/REASON FOR VISIT:  Chief Complaint   Patient presents with     Follow-up     labs        HISTORY:      HPI: Abeba is a 80 y.o. female  has a past medical history of Adult failure to thrive, PEDRO (acute kidney injury) (H), Altered mental status, Chronic low back pain, CKD (chronic kidney disease), Elevated serum creatinine, GERD (gastroesophageal reflux disease), Lumbar canal stenosis, and Milk alkali syndrome. Patient seen today for an acute visit in LTC. Patient notes she is doing okay, she does have increased edema in BLE which she notes isn't new, but has increased since her admission and she usually is on low-dose Lasix to treat this. We also continue to work-up her Hgb - it's variable level has been of concern since her last stay with us - labs today as noted below. Therapies continue to work with her in preparation for transition into [facility]. Appetite continues to be decreased. She is sleeping well. Denies CP, palpitations, fatigue, nausea, vomiting, increased SOB/GONZALES, fever, chills, and/or b/b concerns today.    Past Medical History:   Diagnosis Date     Adult failure to thrive      PEDRO (acute kidney injury) (H)      Altered mental status      Chronic low back pain      CKD (chronic kidney disease)      Elevated serum creatinine      GERD (gastroesophageal reflux disease)      Lumbar canal stenosis      Milk alkali syndrome              Family History   Problem Relation Age of Onset     Alcohol abuse Father      Lung cancer Father      Diabetes Father      Depression Brother         suicide     Social History     Socioeconomic History     Marital status:      Spouse name: Not on file     Number of children: Not on file     Years of education: Not on file     Highest education level: Not on file   Occupational History     Not on file   Social Needs     Financial resource  "strain: Not on file     Food insecurity     Worry: Not on file     Inability: Not on file     Transportation needs     Medical: Not on file     Non-medical: Not on file   Tobacco Use     Smoking status: Former Smoker     Packs/day: 0.50     Types: Cigarettes     Smokeless tobacco: Never Used   Substance and Sexual Activity     Alcohol use: Yes     Drug use: Not on file     Sexual activity: Not on file   Lifestyle     Physical activity     Days per week: Not on file     Minutes per session: Not on file     Stress: Not on file   Relationships     Social connections     Talks on phone: Not on file     Gets together: Not on file     Attends Hoahaoism service: Not on file     Active member of club or organization: Not on file     Attends meetings of clubs or organizations: Not on file     Relationship status: Not on file     Intimate partner violence     Fear of current or ex partner: Not on file     Emotionally abused: Not on file     Physically abused: Not on file     Forced sexual activity: Not on file   Other Topics Concern     Not on file   Social History Narrative     Not on file       ROS:  10 point ROS of systems including Constitutional, Eyes, Respiratory, Cardiovascular, Gastroenterology, Genitourinary, Integumentary, Musculoskeletal, Psychiatric were all negative except for pertinent positives noted in my HPI.  Review of Systems    EXAM:  Vitals:    05/18/21 1207   BP: 141/54   Pulse: 98   Resp: 18   Temp: 97.8  F (36.6  C)   SpO2: 97%   Weight: 117 lb 6.4 oz (53.3 kg)   Height: 5' 5\" (1.651 m)     GENERAL APPEARANCE:  Alert, in no distress, appears healthy, oriented, thin, cooperative, elderly woman resting in bed  EYES:  EOM, conjunctivae, lids, pupils and irises normal, wears glasses  RESP:  Respiratory effort and palpation of chest normal, lungs clear to auscultation, no respiratory distress  CV:  Palpation and auscultation of heart done, regular rate and rhythm, no murmur, rub, or gallop, no edema, +2 " pedal pulses  ABDOMEN: Normal bowel sounds, soft, nontender, no hepatosplenomegaly or other masses  M/S:   Gait and station abnormal - STARR 2/2 patient being in bed; Digits and nails abnormal - arthritic changes present  SKIN:  Palpation of skin and subcutaneous tissue baseline, Inspection of skin and subcutaneous tissue normal. Skin thin and dry.  NEURO: Cranial nerves 2-12 are normal tested and grossly at patient's baseline  PSYCH: Oriented X 3, normal insight, judgement and memory, affect and mood normal      LABS:   Reviewed in Epic   Ref. Range 5/16/2021 09:05 5/18/2021 05:52   Sodium Latest Ref Range: 136 - 145 mmol/L  140   Potassium Latest Ref Range: 3.5 - 5.0 mmol/L  5.2 (H)   Chloride Latest Ref Range: 98 - 107 mmol/L  110 (H)   CO2 Latest Ref Range: 22 - 31 mmol/L  23   Anion Gap, Calculation Latest Ref Range: 5 - 18 mmol/L  7   BUN Latest Ref Range: 8 - 28 mg/dL  39 (H)   Creatinine Latest Ref Range: 0.60 - 1.10 mg/dL  1.65 (H)   GFR MDRD Af Amer Latest Ref Range: >60 mL/min/1.73m2  36 (L)   GFR MDRD Non Af Amer Latest Ref Range: >60 mL/min/1.73m2  30 (L)   Calcium Latest Ref Range: 8.5 - 10.5 mg/dL  9.1   AST Latest Ref Range: 0 - 40 U/L  16   ALT Latest Ref Range: 0 - 45 U/L  21   ALBUMIN Latest Ref Range: 3.5 - 5.0 g/dL  2.2 (L)   Protein, Total Latest Ref Range: 6.0 - 8.0 g/dL  4.5 (L)   Alkaline Phosphatase Latest Ref Range: 45 - 120 U/L  70   Bilirubin, Total Latest Ref Range: 0.0 - 1.0 mg/dL  0.2   Iron Latest Ref Range: 42 - 175 ug/dL  24 (L)   Glucose Latest Ref Range: 70 - 125 mg/dL  85   Folate Latest Ref Range: >=3.5 ng/mL  9.3   Vitamin B-12 Latest Ref Range: 213 - 816 pg/mL  270   Transferrin Latest Ref Range: 212 - 360 mg/dL  215   Transferrin IBC, Calculated Latest Ref Range: 313 - 563 ug/dL  269 (L)   Transferrin Saturation, Calculated Latest Ref Range: 20 - 50 %  9 (L)   WBC Latest Ref Range: 4.0 - 11.0 thou/uL  3.6 (L)   RBC Latest Ref Range: 3.80 - 5.40 mill/uL  2.50 (L)   Hemoglobin  Latest Ref Range: 12.0 - 16.0 g/dL 7.9 (L) 7.0 (L)   Hematocrit Latest Ref Range: 35.0 - 47.0 %  23.0 (L)   MCV Latest Ref Range: 80 - 100 fL  92   MCH Latest Ref Range: 27.0 - 34.0 pg  28.0   MCHC Latest Ref Range: 32.0 - 36.0 g/dL  30.4 (L)   RDW Latest Ref Range: 11.0 - 14.5 %  16.9 (H)   Platelets Latest Ref Range: 140 - 440 thou/uL  280   MPV Latest Ref Range: 8.5 - 12.5 fL  11.6       ASSESSMENT/PLAN:      ICD-10-CM    1. Anemia, unspecified type  D64.9 Acute on chronic, unstable. Continues to be   2. FTT (failure to thrive) in adult  R62.7 Variable. Is up today to 7.9 from 7.0; Iron today noted at 24 along with other iron labs lower than desired. Will start on regimen of FeSO4 qMWF along with Vit C 500mg PO qDay - plan to recheck Hgb and Iron level on 5/21/21.   3. Edema of both lower extremities  R60.0 Acute on chronic, unstable. Increased edema as above - will restart PTA regimen of Lasix 10mg PO qDay with ongoing monitoring   4. Hyperkalemia  E87.5 Acute, unstable. Coincidentally noted with today's labs - Restarting Lasix as noted above; will plan to recheck BMP on 5/21/21.       Electronically signed by:   Dr. Omayra Torres, APRN, TESSA  [place] [facility]  Office: [facility] Office: [address] [place] [address]    [place] Cell: [phone]  [place] Fax: [phone]    [facility] Phone: [phone]  [facility] Voicemail: [phone]

## 2021-06-26 NOTE — PROGRESS NOTES
[facility]    Facility:   [facility] [[id number]]   Code Status: DNR/DNI      CHIEF COMPLAINT/REASON FOR VISIT:  Chief Complaint   Patient presents with     Problem Visit     anemia        HISTORY:      HPI: Abeba is a 80 y.o. female  has a past medical history of Adult failure to thrive, PEDRO (acute kidney injury) (H), Altered mental status, Chronic low back pain, CKD (chronic kidney disease), Elevated serum creatinine, GERD (gastroesophageal reflux disease), Lumbar canal stenosis, and Milk alkali syndrome.  Resident seen today to follow-up on ongoing anemia with variable hemoglobin levels and chronic malnutrition.  Previous lab results from 5/21 never called into provider given drop in hemoglobin to 7.0.  Resident denies acute associated symptoms today other than feeling increasingly fatigued.  She has been noted to have a few falls since her last visit, this could be directly correlated with this drop in hemoglobin.  Appetite continues to improve she is sleeping more than normal. Denies CP, palpitations, fatigue, nausea, vomiting, increased SOB/GONZALES, fever, chills, and/or b/b concerns today.      Past Medical History:   Diagnosis Date     Adult failure to thrive      PEDRO (acute kidney injury) (H)      Altered mental status      Chronic low back pain      CKD (chronic kidney disease)      Elevated serum creatinine      GERD (gastroesophageal reflux disease)      Lumbar canal stenosis      Milk alkali syndrome              Family History   Problem Relation Age of Onset     Alcohol abuse Father      Lung cancer Father      Diabetes Father      Depression Brother         suicide     Social History     Socioeconomic History     Marital status:      Spouse name: Not on file     Number of children: Not on file     Years of education: Not on file     Highest education level: Not on file   Occupational History     Not on file   Social Needs     Financial resource strain: Not on  "file     Food insecurity     Worry: Not on file     Inability: Not on file     Transportation needs     Medical: Not on file     Non-medical: Not on file   Tobacco Use     Smoking status: Former Smoker     Packs/day: 0.50     Types: Cigarettes     Smokeless tobacco: Never Used   Substance and Sexual Activity     Alcohol use: Yes     Drug use: Not on file     Sexual activity: Not on file   Lifestyle     Physical activity     Days per week: Not on file     Minutes per session: Not on file     Stress: Not on file   Relationships     Social connections     Talks on phone: Not on file     Gets together: Not on file     Attends Temple service: Not on file     Active member of club or organization: Not on file     Attends meetings of clubs or organizations: Not on file     Relationship status: Not on file     Intimate partner violence     Fear of current or ex partner: Not on file     Emotionally abused: Not on file     Physically abused: Not on file     Forced sexual activity: Not on file   Other Topics Concern     Not on file   Social History Narrative     Not on file       ROS:  10 point ROS of systems including Constitutional, Eyes, Respiratory, Cardiovascular, Gastroenterology, Genitourinary, Integumentary, Musculoskeletal, Psychiatric were all negative except for pertinent positives noted in my HPI.    EXAM:  Vitals:    06/04/21 1043   BP: 105/51   Pulse: 88   Resp: 18   Temp: 97.7  F (36.5  C)   SpO2: 98%   Weight: 117 lb 3.2 oz (53.2 kg)   Height: 5' 5\" (1.651 m)     GENERAL APPEARANCE:  Alert, in no distress, thin, cooperative  RESP:  lungs clear to auscultation , no respiratory distress, cough -None  CV:  Palpation and auscultation of heart done , regular rate and rhythm, no murmur, rub, or gallop, no edema  M/S:   Gait and station abnormal -Wheelchair use for long distances, using front wheeled walker in room  Digits and nails abnormal -Arthritic changes present  SKIN:  Inspection of skin and subcutaneous " tissue baseline, Skin thin, fragile, and dry  PSYCH:  oriented X 3, normal insight, judgement and memory, affect and mood normal      LABS:   Reviewed in epic    ASSESSMENT/PLAN:      ICD-10-CM    1. Anemia, unspecified type  D64.9  acute on chronic-unstable.  Writer previously noted lack of communication and notification of 6/2 labs with drop in hemoglobin to 7.2, down from 8.0 on 5/21.  Follow-up lab on 6/3 with hemoglobin of 7.7, today's hemoglobin at 7.6 does continue on regimen of iron and vitamin C.  Recheck hemoglobin and iron level on 6/8   2. FTT (failure to thrive) in adult  R62.7  acute on chronic-unstable.  Resident notes   3.   Moderate protein-calorie malnutrition (H)  E 44.0  that her appetite is improving, admission weight at 106.8 pounds, today's weight:  Vitals:    06/04/21 1043   Weight: 117 lb 3.2 oz (53.2 kg)   Continues to be managed by in-house dietitian, on daily supplement regimen.  Recheck albumin on 6/8/2021     Electronically signed by:   LEONARDO Woodson, TESSA  [place] [facility]  Office: [facility] Office: [address] [place] [address]    [place] Cell: [phone]  [place] Fax: [phone]    [facility] Phone: [phone]  [facility] Voicemail: [phone]

## 2021-06-28 NOTE — PROGRESS NOTES
"Progress Notes by Mandy Zhang CNP at 4/10/2020 12:02 PM     Author: Mandy Zhang CNP Service: -- Author Type: Nurse Practitioner    Filed: 4/10/2020 12:43 PM Encounter Date: 4/10/2020 Status: Attested    : Mandy Zhang CNP (Nurse Practitioner) Cosigner: Hailey Wilkinson MBBS at 4/10/2020 12:49 PM    Attestation signed by Hailey Wilkinson MBBS at 4/10/2020 12:49 PM    AGREE WITH DC NOTE                [facility]   Video Visit    Code Status: DNR    Abeba Martinez is a 79 y.o. female who is being evaluated via a billable video visit.      The patient has been notified of following:     \"This video visit will be conducted via a call between you and your physician/provider. We have found that certain health care needs can be provided without the need for an in-person physical exam.  This service lets us provide the care you need with a video conversation.  If a prescription is necessary we can send it to the facility team.  If lab work is needed we can place an order through the facility team to have that test done at a later time.    If during the course of the call the physician/provider feels a video visit is not appropriate, you will not be charged for this service.\"     Physician/provider has received verbal consent for a Video Visit from the patient? Yes        Video Start Time: 1125am    Chief Complaint/Reason for Visit:  Chief Complaint   Patient presents with   ? Discharge Summary       HPI:   Abeba is a 79 y.o. female who is seen today for an unanticipated dc home.She is to dc home AMA, but to make this a safer dc we are seeing her and will have A services follow up with her. She will have RN, PT/OT follow her in her home in [facility]. Abeba reports her son lives nearby and will check in with her. We did review her meds, having nursing help her set these up, pain management for pain and her BM status. She is instructed to take senna as ordered, she was educated on proper " exercise and diete to help constipation. Also encouraged to fu with her PCP within 7 days. She will need labs on 4/13 as per hospital dc. She came from [facility] where she was inpatient from 4/4 to 4/6 She had presented with weakness due to hypercalcemia and it would benefiet her to have therapies in the TCU. She is refusing to stay and her son is picking her up shortly.     Minimal Physical exam due to telehealth:   Respirations even unlabored.  ExtremetiesL Sitting upright, ambulates with poor gait  Expoused skin w/o bruising or lesions  Nursing reports BS present  Lungs clear  HR: RRR      I have reviewed and updated the patient's Past Medical History, Social History, Family History and Medication List.    ALLERGIES  Patient has no known allergies.    Review of Systems   Does reports some BM concerns, has senna and feels this helps with BMS, negative for NV, shortness of breath, CP or other complaints    Vitals:    04/10/20 1211   BP: 103/57   Pulse: 90   Temp: 97.6  F (36.4  C)   Weight: 109 lb 12.8 oz (49.8 kg)         MEDICATION LIST:  Current Outpatient Medications   Medication Sig   ? acetaminophen (TYLENOL) 500 MG tablet Take 1,000 mg by mouth every 6 (six) hours as needed for pain.   ? FLUoxetine (PROZAC) 20 MG tablet Take 20 mg by mouth daily.   ? gabapentin (NEURONTIN) 100 MG capsule Take 200 mg by mouth as needed.   ? lansoprazole (PREVACID) 30 MG capsule Take 30 mg by mouth 2 (two) times a day.   ? polyvinyl alcohol (LIQUIFILM TEARS) 1.4 % ophthalmic solution Administer 1 drop to both eyes every 4 (four) hours as needed for dry eyes.   ? senna-docusate (SENNOSIDES-DOCUSATE SODIUM) 8.6-50 mg tablet Take 2 tablets by mouth every 12 (twelve) hours as needed for constipation.   ? simethicone (MYLICON) 80 MG chewable tablet Chew 80 mg every 6 (six) hours as needed for flatulence.   ? traMADoL (ULTRAM) 50 mg tablet Take 1 tablet (50 mg total) by mouth 2 (two) times a day as needed for pain.   ?  traZODone (DESYREL) 150 MG tablet Take 75 mg by mouth at bedtime.        Labs: Fu labs with HHA on Monday 4/13/20    Assessment/Plan:    ICD-10-CM    1. Hypercalcemia  E83.52    2. PEDRO (acute kidney injury) (H)  N17.9    3. Drug-induced constipation  K59.03       .      DISCHARGE PLAN/FACE TO FACE: I certify that this patient is under Dr. Wilkinson's care, seen by the NP, and had a face-to-face encounter that meets the physician face-to-face encounter requirements.  The encounter was in whole, or part related to the primary reason for home health.  The Patient is homebound due to: General deconditioned state due to hyponatremia and CHF and  it is taxing and it will take a considerable amount of effort for patient to leave the home.  She is dependent on others for transportation.  The patient is confined to her home and needs intermittent skilled nursing, PT,OT, RN, and HHA.  The patient has been under the care of Dr. Wilkinson/NP and Dr. Wilkinson  initiated the establishment of the plan of care.         Patient to be followed by home care for physical therapy to eval and treat for strengthening, balance, endurance, and safety with mobility, and ambulation.  Patient to be followed by home care for occupational therapy to eval and treat for strengthening, ADL needs, adaptive equipment, and safety.  Patient to be followed by home care for nursing services for medication set up and teaching, symptom and disease processes monitoring and education.    Patient to be followed by home care for home health aid services for bathing and ADL needs.  Planned discharge.  All therapy goals have been met.  Family will assist with discharge and transportation.           Patient will follow up with PCP within 7- days after discharge for medication mangagment and appropriate lab studies.         PCP: Nicanor Juarez MD   Phone: [phone]   Fax: [phone]    Video-Visit Details    Type of service:  Video Visit    Video End Time (time  video stopped): 1145 am    Originating Location (pt. Location):[facility] [[id number]]    Distant Location (provider location):  [facility]   Post Discharge Medication Reconciliation Status: discharge medications reconciled and changed, per note/orders (see AVS)  Mode of Communication:  Zoom Video Conference    Mandy Zhang, CNP

## 2021-06-30 ENCOUNTER — RECORDS - HEALTHEAST (OUTPATIENT)
Dept: LAB | Facility: CLINIC | Age: 81
End: 2021-06-30

## 2021-07-02 LAB
ANION GAP SERPL CALCULATED.3IONS-SCNC: 8 MMOL/L (ref 5–18)
BUN SERPL-MCNC: 28 MG/DL (ref 8–28)
CALCIUM SERPL-MCNC: 9.2 MG/DL (ref 8.5–10.5)
CHLORIDE BLD-SCNC: 111 MMOL/L (ref 98–107)
CO2 SERPL-SCNC: 18 MMOL/L (ref 22–31)
CREAT SERPL-MCNC: 1.62 MG/DL (ref 0.6–1.1)
ERYTHROCYTE [DISTWIDTH] IN BLOOD BY AUTOMATED COUNT: 17.4 % (ref 11–14.5)
GFR SERPL CREATININE-BSD FRML MDRD: 30 ML/MIN/1.73M2
GLUCOSE BLD-MCNC: 73 MG/DL (ref 70–125)
HCT VFR BLD AUTO: 25 % (ref 35–47)
HGB BLD-MCNC: 7.8 G/DL (ref 12–16)
MCH RBC QN AUTO: 29.3 PG (ref 27–34)
MCHC RBC AUTO-ENTMCNC: 31.2 G/DL (ref 32–36)
MCV RBC AUTO: 94 FL (ref 80–100)
PLATELET # BLD AUTO: 253 THOU/UL (ref 140–440)
PMV BLD AUTO: 11.6 FL (ref 8.5–12.5)
POTASSIUM BLD-SCNC: 4.6 MMOL/L (ref 3.5–5)
RBC # BLD AUTO: 2.66 MILL/UL (ref 3.8–5.4)
SODIUM SERPL-SCNC: 137 MMOL/L (ref 136–145)
WBC: 6.3 THOU/UL (ref 4–11)

## 2021-07-04 NOTE — LETTER
Letter by Laura Torres NP at      Author: Laura Torres NP Service: -- Author Type: --    Filed:  Encounter Date: 6/4/2021 Status: (Other)         [facility] [employer]  [address] [place] [address]                                  Yaneth 15, 2021    Patient: Abeba Martinez   MR Number: [id number]   YOB: 1940   Date of Visit: 6/4/2021     Dear Dr. Home:    Thank you for referring Abeba Martinez to me for evaluation. Below are the relevant portions of my assessment and plan of care.    If you have questions, please do not hesitate to call me. I look forward to following Abeba along with you.    Sincerely,        Laura Torres NP          CC  No Recipients  Laura Torres NP  6/15/2021  5:38 PM  Sign when Signing Visit  [facility]    Facility:   [facility] [[id number]]   Code Status: DNR/DNI      CHIEF COMPLAINT/REASON FOR VISIT:  Chief Complaint   Patient presents with   ? Problem Visit     anemia        HISTORY:      HPI: Abeba is a 80 y.o. female  has a past medical history of Adult failure to thrive, PEDRO (acute kidney injury) (H), Altered mental status, Chronic low back pain, CKD (chronic kidney disease), Elevated serum creatinine, GERD (gastroesophageal reflux disease), Lumbar canal stenosis, and Milk alkali syndrome.  Resident seen today to follow-up on ongoing anemia with variable hemoglobin levels and chronic malnutrition.  Previous lab results from 5/21 never called into provider given drop in hemoglobin to 7.0.  Resident denies acute associated symptoms today other than feeling increasingly fatigued.  She has been noted to have a few falls since her last visit, this could be directly correlated with this drop in hemoglobin.  Appetite continues to improve she is sleeping more than normal. Denies CP, palpitations, fatigue, nausea, vomiting, increased SOB/GONZALES, fever, chills, and/or b/b concerns  today.      Past Medical History:   Diagnosis Date   ? Adult failure to thrive    ? PEDRO (acute kidney injury) (H)    ? Altered mental status    ? Chronic low back pain    ? CKD (chronic kidney disease)    ? Elevated serum creatinine    ? GERD (gastroesophageal reflux disease)    ? Lumbar canal stenosis    ? Milk alkali syndrome              Family History   Problem Relation Age of Onset   ? Alcohol abuse Father    ? Lung cancer Father    ? Diabetes Father    ? Depression Brother         suicide     Social History     Socioeconomic History   ? Marital status:      Spouse name: Not on file   ? Number of children: Not on file   ? Years of education: Not on file   ? Highest education level: Not on file   Occupational History   ? Not on file   Social Needs   ? Financial resource strain: Not on file   ? Food insecurity     Worry: Not on file     Inability: Not on file   ? Transportation needs     Medical: Not on file     Non-medical: Not on file   Tobacco Use   ? Smoking status: Former Smoker     Packs/day: 0.50     Types: Cigarettes   ? Smokeless tobacco: Never Used   Substance and Sexual Activity   ? Alcohol use: Yes   ? Drug use: Not on file   ? Sexual activity: Not on file   Lifestyle   ? Physical activity     Days per week: Not on file     Minutes per session: Not on file   ? Stress: Not on file   Relationships   ? Social connections     Talks on phone: Not on file     Gets together: Not on file     Attends Voodoo service: Not on file     Active member of club or organization: Not on file     Attends meetings of clubs or organizations: Not on file     Relationship status: Not on file   ? Intimate partner violence     Fear of current or ex partner: Not on file     Emotionally abused: Not on file     Physically abused: Not on file     Forced sexual activity: Not on file   Other Topics Concern   ? Not on file   Social History Narrative   ? Not on file       ROS:  10 point ROS of systems including  "Constitutional, Eyes, Respiratory, Cardiovascular, Gastroenterology, Genitourinary, Integumentary, Musculoskeletal, Psychiatric were all negative except for pertinent positives noted in my HPI.    EXAM:  Vitals:    06/04/21 1043   BP: 105/51   Pulse: 88   Resp: 18   Temp: 97.7  F (36.5  C)   SpO2: 98%   Weight: 117 lb 3.2 oz (53.2 kg)   Height: 5' 5\" (1.651 m)     GENERAL APPEARANCE:  Alert, in no distress, thin, cooperative  RESP:  lungs clear to auscultation , no respiratory distress, cough -None  CV:  Palpation and auscultation of heart done , regular rate and rhythm, no murmur, rub, or gallop, no edema  M/S:   Gait and station abnormal -Wheelchair use for long distances, using front wheeled walker in room  Digits and nails abnormal -Arthritic changes present  SKIN:  Inspection of skin and subcutaneous tissue baseline, Skin thin, fragile, and dry  PSYCH:  oriented X 3, normal insight, judgement and memory, affect and mood normal      LABS:   Reviewed in epic    ASSESSMENT/PLAN:      ICD-10-CM    1. Anemia, unspecified type  D64.9  acute on chronic-unstable.  Writer previously noted lack of communication and notification of 6/2 labs with drop in hemoglobin to 7.2, down from 8.0 on 5/21.  Follow-up lab on 6/3 with hemoglobin of 7.7, today's hemoglobin at 7.6 does continue on regimen of iron and vitamin C.  Recheck hemoglobin and iron level on 6/8   2. FTT (failure to thrive) in adult  R62.7  acute on chronic-unstable.  Resident notes   3.   Moderate protein-calorie malnutrition (H)  E 44.0  that her appetite is improving, admission weight at 106.8 pounds, today's weight:  Vitals:    06/04/21 1043   Weight: 117 lb 3.2 oz (53.2 kg)   Continues to be managed by in-house dietitian, on daily supplement regimen.  Recheck albumin on 6/8/2021     Electronically signed by:   LEONARDO Woodson, TESSA  [facility]  Office: [facility] Office: [address] [place] " [address]    [place] Cell: [phone]  [place] Fax: [phone]    [facility] Phone: [phone]  [facility] Voicemail: [phone]

## 2021-07-04 NOTE — LETTER
"Letter by Laura Torres NP at      Author: Laura Torres NP Service: -- Author Type: --    Filed:  Encounter Date: 5/14/2021 Status: (Other)         [facility] [employer]  [address] [place] [address]                                  Yaneth 3, 2021    Patient: Abeba Martinez   MR Number: [id number]   YOB: 1940   Date of Visit: 5/14/2021     Dear Dr. Alvarenga:    Thank you for referring Abeba Martinez to me for evaluation. Below are the relevant portions of my assessment and plan of care.    If you have questions, please do not hesitate to call me. I look forward to following Abeba along with you.    Sincerely,        Laura Torres NP          CC  No Recipients  Laura Torres NP  6/3/2021  8:49 PM  Sign when Signing Visit  [facility]     Facility:   [facility] [[id number]]   Code Status: DNR/DNI      CHIEF COMPLAINT/REASON FOR VISIT:  Chief Complaint   Patient presents with   ? Follow-up       HISTORY:      HPI: Abeba is a 80 y.o. female  has a past medical history of Adult failure to thrive, PEDRO (acute kidney injury) (H), Altered mental status, Chronic low back pain, CKD (chronic kidney disease), Elevated serum creatinine, GERD (gastroesophageal reflux disease), Lumbar canal stenosis, and Milk alkali syndrome. Patient seen today for a follow-up visit in TCU. Patient notes things are going okay, she is seen today resting. She notes she is feeling \"pretty good\". Therapies are going ok. Appetite is down, but she feels like it's improving. She is sleeping well. Denies CP, palpitations, fatigue, nausea, vomiting, increased SOB/GONZALES, fever, chills, and/or b/b concerns today.    Labs completed yesterday and not called to provider note a drop in the patient's Hgb as shown below - we discuss the potential for a transfusion and she is willing to undergo this should it be needed if her Hgb becomes <7.0; she does note to bei2ng a " bit more fatigued than before but otherwise denies overt sx.     Ref. Range 5/3/2021 08:41 5/13/2021 05:59   Hemoglobin Latest Ref Range: 12.0 - 16.0 g/dL 9.0 (L) 7.2 (L)         Past Medical History:   Diagnosis Date   ? Adult failure to thrive    ? PEDRO (acute kidney injury) (H)    ? Altered mental status    ? Chronic low back pain    ? CKD (chronic kidney disease)    ? Elevated serum creatinine    ? GERD (gastroesophageal reflux disease)    ? Lumbar canal stenosis    ? Milk alkali syndrome              Family History   Problem Relation Age of Onset   ? Alcohol abuse Father    ? Lung cancer Father    ? Diabetes Father    ? Depression Brother         suicide     Social History     Socioeconomic History   ? Marital status:      Spouse name: Not on file   ? Number of children: Not on file   ? Years of education: Not on file   ? Highest education level: Not on file   Occupational History   ? Not on file   Social Needs   ? Financial resource strain: Not on file   ? Food insecurity     Worry: Not on file     Inability: Not on file   ? Transportation needs     Medical: Not on file     Non-medical: Not on file   Tobacco Use   ? Smoking status: Former Smoker     Packs/day: 0.50     Types: Cigarettes   ? Smokeless tobacco: Never Used   Substance and Sexual Activity   ? Alcohol use: Yes   ? Drug use: Not on file   ? Sexual activity: Not on file   Lifestyle   ? Physical activity     Days per week: Not on file     Minutes per session: Not on file   ? Stress: Not on file   Relationships   ? Social connections     Talks on phone: Not on file     Gets together: Not on file     Attends Anabaptism service: Not on file     Active member of club or organization: Not on file     Attends meetings of clubs or organizations: Not on file     Relationship status: Not on file   ? Intimate partner violence     Fear of current or ex partner: Not on file     Emotionally abused: Not on file     Physically abused: Not on file     Forced  "sexual activity: Not on file   Other Topics Concern   ? Not on file   Social History Narrative   ? Not on file       ROS:  10 point ROS of systems including Constitutional, Eyes, Respiratory, Cardiovascular, Gastroenterology, Genitourinary, Integumentary, Musculoskeletal, Psychiatric were all negative except for pertinent positives noted in my HPI.    EXAM:  Vitals:    05/14/21 1252   BP: 143/60   Pulse: 97   Resp: 18   Temp: 97.7  F (36.5  C)   SpO2: 97%   Weight: 117 lb 6.4 oz (53.3 kg)   Height: 5' 5\" (1.651 m)     GENERAL APPEARANCE:  Alert, in no distress, appears healthy, oriented, thin, cooperative, elderly woman in room  EYES:  EOM, conjunctivae, lids, pupils and irises normal  NECK:  No adenopathy,masses or thyromegaly  RESP:  Respiratory effort and palpation of chest normal, lungs clear to auscultation, no respiratory distress  CV:  Palpation and auscultation of heart done , regular rate and rhythm, no murmur, rub, or gallop, no edema, +2 pedal pulses  ABDOMEN: Normal bowel sounds, soft, nontender, no hepatosplenomegaly or other masses  M/S:   Gait and station abnormal - STARR 2/2 patient being in bed; Digits and nails abnormal - arthritic changes present  SKIN:  Palpation of skin and subcutaneous tissue baseline, Inspection of skin and subcutaneous tissue normal. Skin thin and dry.  NEURO:   Cranial nerves 2-12 are normal tested and grossly at patient's baseline  PSYCH:  oriented X 3, normal insight, judgement and memory, affect and mood normal      LABS:   Reviewed in EPIC    ASSESSMENT/PLAN:      ICD-10-CM    1. Chronic midline low back pain with sciatica, sciatica laterality unspecified  M54.40 Acute on chronic - variable. Pain controlled today - she does note increased pain at times    G89.29 When she works with therapies. Managed on regimen of scheduled Tylenol.   2. Post herpetic neuralgia  B02.29 Chronic - stable. Managed on regimen of Gabapentin - no acute concerns today - continue meds as ordered. "   3. Moderate episode of recurrent major depressive disorder (H)  F33.1 Chronic - stable? Appears pleasant, denies depressive sx today. maanged on Remeron and Trazodone. PHQ9 02/27. Continue medications as ordered; PHQ9 per protocol; in-house ACP psychology PRN.   4. Hyponatremia  E87.1 Acute - improving. Today's level up to 142.  CMP 5/18/21.   5. FTT (failure to thrive) in adult  R62.7 Acute on chronic - unstable, improving. She feels as though her appetite is improving as noted above, today's weight 117.4lbs - admission weight noted at 106lbs, doubtful of accuracy given dramatic uncharted drop from prior admission. No recent albumin level. CMP ordered as above. Does continue on Ensure two times a day.   6. PEDRO (acute kidney injury) (H)  N17.9 Acute - unstable, improving. Labs as noted below - voiding without concern. Continue to encourage fluids - CMP recheck as noted above.   Ref. Range 5/11/2021 05:15 5/14/2021 06:41   BUN Latest Ref Range: 8 - 28 mg/dL 28 31 (H)   Creatinine Latest Ref Range: 0.60 - 1.10 mg/dL 1.60 (H) 1.48 (H)   GFR MDRD Af Amer Latest Ref Range: >60 mL/min/1.73m2 37 (L) 41 (L)   GFR MDRD Non Af Amer Latest Ref Range: >60 mL/min/1.73m2 31 (L) 34 (L)      7. Gastroesophageal reflux disease without esophagitis  K21.9 Chronic - stable. Managed on regimen of Omeprazole - however, noted drop in Hgb as stated below despite denial of GI concerns.    8. Anemia, unspecified type  D64.9 Acute on chronic - unstable. Encountered similar variabilities during last TCU stay of unknown origin at that time, she had discharged back into the community at that time prior to further workup - Recent drop as noted above. At this time we will discontinue Omeprazole. Start Protonix 40mg two times a day and Carafate 1g PO four times a day; attain Guaiac stools x3; Recheck a Hgb STAT then again on 5/16/21 - Recheck CBC, TIBC, iron level, folic acid level, Vit B12 level and Transferrin saturation on 5/18/21.   9.  Physical deconditioning R53.81 Acute on chronic - unstable. 2/2 above noted diagnoses and recurrent hospitalizations. PT/OT following - adv per their recommendations. SW to assist with discharge planning.        Electronically signed by:   LEONARDO Woodson, [facility]  Office: [facility] Office: [address] [place] [address]    [place] Cell: [phone]  [place] Fax: [phone]    [facility] Phone: [phone]  [facility] Voicemail: [phone]

## 2021-07-04 NOTE — LETTER
Letter by Laura Torres NP at      Author: Laura Torres NP Service: -- Author Type: --    Filed:  Encounter Date: 5/21/2021 Status: (Other)         [facility]  [address] [place] [address]                                  Yaneth 10, 2021    Patient: Abeba Martinez   MR Number: [id number]   YOB: 1940   Date of Visit: 5/21/2021     Dear Dr. Home:    Thank you for referring Abeba Martinez to me for evaluation. Below are the relevant portions of my assessment and plan of care.    If you have questions, please do not hesitate to call me. I look forward to following Abeba along with you.    Sincerely,        Laura Torres NP          CC  No Recipients  Laura Torres NP  5/27/2021  1:44 PM  Signed  [facility]    Facility:   [facility] [[id number]]   Code Status: DNR/DNI     To whom it may concern:    I have been caring for Abeba Martinez since 4/27/2021 at [facility] as her Primary Care Provider following a recent hospitalization. During this time she has been working with Physical Therapy and has been deemed unsafe to reside independently in her own residence due to high fall risk, limited safety awareness and fatigue. Please allow her and her family out of her current lease so she may transition to a safer living environment. Thank you.     Electronically signed by:   Dr. Omayra Torres, LEONARDO, [facility]  Office: [facility] Office: [address] [place] [address]    [place] Cell: [phone]  [place] Fax: [phone]    [facility] Phone: [phone]  [facility] Voicemail: [phone]        Laura Torres NP  6/10/2021  6:14 PM  Sign when Signing Visit  [facility]    Facility:   [facility] [[id number]]   Code Status: DNR/DNI      CHIEF COMPLAINT/REASON FOR VISIT:  Chief Complaint   Patient presents with    ? Follow Up       HISTORY:      HPI: Abeba is a 80 y.o. female  has a past medical history of Adult failure to thrive, PEDRO (acute kidney injury) (H), Altered mental status, Chronic low back pain, CKD (chronic kidney disease), Elevated serum creatinine, GERD (gastroesophageal reflux disease), Lumbar canal stenosis, and Milk alkali syndrome.  Resident seen today to follow-up on ongoing anemia with variable hemoglobin levels.  Currently continue to await today's lab results.  She does note that she is feeling fine, does not note any change in her physiological status, which is down from a prior to hospitalized.  She is sleeping well, sometimes too much.  Continues to work with therapies, ongoing plans to transition to assist living facility.  Family present today visit, requesting a letter from writer noting that her inability to live independently ongoing to aid in breaking her lease at her current and.  No other acute concerns voiced today.    Past Medical History:   Diagnosis Date   ? Adult failure to thrive    ? PEDRO (acute kidney injury) (H)    ? Altered mental status    ? Chronic low back pain    ? CKD (chronic kidney disease)    ? Elevated serum creatinine    ? GERD (gastroesophageal reflux disease)    ? Lumbar canal stenosis    ? Milk alkali syndrome              Family History   Problem Relation Age of Onset   ? Alcohol abuse Father    ? Lung cancer Father    ? Diabetes Father    ? Depression Brother         suicide     Social History     Socioeconomic History   ? Marital status:      Spouse name: Not on file   ? Number of children: Not on file   ? Years of education: Not on file   ? Highest education level: Not on file   Occupational History   ? Not on file   Social Needs   ? Financial resource strain: Not on file   ? Food insecurity     Worry: Not on file     Inability: Not on file   ? Transportation needs     Medical: Not on file     Non-medical: Not on file   Tobacco Use   ? Smoking status: Former  "Smoker     Packs/day: 0.50     Types: Cigarettes   ? Smokeless tobacco: Never Used   Substance and Sexual Activity   ? Alcohol use: Yes   ? Drug use: Not on file   ? Sexual activity: Not on file   Lifestyle   ? Physical activity     Days per week: Not on file     Minutes per session: Not on file   ? Stress: Not on file   Relationships   ? Social connections     Talks on phone: Not on file     Gets together: Not on file     Attends Hinduism service: Not on file     Active member of club or organization: Not on file     Attends meetings of clubs or organizations: Not on file     Relationship status: Not on file   ? Intimate partner violence     Fear of current or ex partner: Not on file     Emotionally abused: Not on file     Physically abused: Not on file     Forced sexual activity: Not on file   Other Topics Concern   ? Not on file   Social History Narrative   ? Not on file       ROS:  10 point ROS of systems including Constitutional, Eyes, Respiratory, Cardiovascular, Gastroenterology, Genitourinary, Integumentary, Musculoskeletal, Psychiatric were all negative except for pertinent positives noted in my HPI.    EXAM:  Vitals:    05/21/21 1031   BP: 99/56   Pulse: 89   Resp: 18   Temp: 98.1  F (36.7  C)   SpO2: 99%   Weight: 117 lb 6.4 oz (53.3 kg)   Height: 5' 5\" (1.651 m)     GENERAL APPEARANCE:  Alert, in no distress, thin, cooperative  RESP:  no respiratory distress, cough -None  CV:  no edema  M/S:   Gait and station abnormal -Wheelchair use for long distances, using front wheeled walker in room  Digits and nails abnormal -Arthritic changes present  SKIN:  Inspection of skin and subcutaneous tissue baseline, Skin thin, fragile, and dry  PSYCH:  oriented X 3, normal insight, judgement and memory, affect and mood normal      LABS:   Awaiting today's labs    ASSESSMENT/PLAN:      ICD-10-CM    1. Anemia, unspecified type  D64.9  acute on chronic-unstable.  Continue to await today's lab work at end of business day. "  No report from facility, labs not available in computer.  We will continue to assess and intervene as appropriate.   2. FTT (failure to thrive) in adult  R62.7  acute on chronic-unstable.  Resident    3. Alteration in performance of activities of daily living  Z78.9  currently in long-term care bed and facility while awaiting placement in assisted living facility once paperwork is appropriately processed.  She was deemed unsafe to reside in independent living anymore.  Family requesting letter to provide rental company to allow patient out of her lease.  See attached documentation.     Electronically signed by:   Dr. Omayra Torres, LEONARDO, TESSA  [facility]  Office: [facility] Office: [address] [place] [address]    [place] Cell: [phone]  [place] Fax: [phone]    [facility] Phone: [phone]  [facility] Voicemail: [phone]

## 2021-07-04 NOTE — LETTER
Letter by Laura Torres NP at      Author: Laura Torres NP Service: -- Author Type: --    Filed:  Encounter Date: 5/18/2021 Status: (Other)         [facility]  [address] [place] [address]                                  June 8, 2021    Patient: Abeba Martinez   MR Number: [id number]   YOB: 1940   Date of Visit: 5/18/2021     Dear Dr. Alvarenga:    Thank you for referring Abeba Martinez to me for evaluation. Below are the relevant portions of my assessment and plan of care.    If you have questions, please do not hesitate to call me. I look forward to following Abeba along with you.    Sincerely,        Laura Torres NP          CC  No Recipients  Laura Torres NP  6/8/2021  5:20 PM  Sign when Signing Visit  [facility]    Facility:   [facility] [[id number]]   Code Status: DNR/DNI      CHIEF COMPLAINT/REASON FOR VISIT:  Chief Complaint   Patient presents with   ? Follow-up     labs        HISTORY:      HPI: Abeba is a 80 y.o. female  has a past medical history of Adult failure to thrive, PEDRO (acute kidney injury) (H), Altered mental status, Chronic low back pain, CKD (chronic kidney disease), Elevated serum creatinine, GERD (gastroesophageal reflux disease), Lumbar canal stenosis, and Milk alkali syndrome. Patient seen today for an acute visit in LTC. Patient notes she is doing okay, she does have increased edema in BLE which she notes isn't new, but has increased since her admission and she usually is on low-dose Lasix to treat this. We also continue to work-up her Hgb - it's variable level has been of concern since her last stay with us - labs today as noted below. Therapies continue to work with her in preparation for transition into skilled nursing. Appetite continues to be decreased. She is sleeping well. Denies CP, palpitations, fatigue, nausea, vomiting, increased SOB/GONZALES, fever, chills, and/or b/b concerns  today.    Past Medical History:   Diagnosis Date   ? Adult failure to thrive    ? PEDRO (acute kidney injury) (H)    ? Altered mental status    ? Chronic low back pain    ? CKD (chronic kidney disease)    ? Elevated serum creatinine    ? GERD (gastroesophageal reflux disease)    ? Lumbar canal stenosis    ? Milk alkali syndrome              Family History   Problem Relation Age of Onset   ? Alcohol abuse Father    ? Lung cancer Father    ? Diabetes Father    ? Depression Brother         suicide     Social History     Socioeconomic History   ? Marital status:      Spouse name: Not on file   ? Number of children: Not on file   ? Years of education: Not on file   ? Highest education level: Not on file   Occupational History   ? Not on file   Social Needs   ? Financial resource strain: Not on file   ? Food insecurity     Worry: Not on file     Inability: Not on file   ? Transportation needs     Medical: Not on file     Non-medical: Not on file   Tobacco Use   ? Smoking status: Former Smoker     Packs/day: 0.50     Types: Cigarettes   ? Smokeless tobacco: Never Used   Substance and Sexual Activity   ? Alcohol use: Yes   ? Drug use: Not on file   ? Sexual activity: Not on file   Lifestyle   ? Physical activity     Days per week: Not on file     Minutes per session: Not on file   ? Stress: Not on file   Relationships   ? Social connections     Talks on phone: Not on file     Gets together: Not on file     Attends Catholic service: Not on file     Active member of club or organization: Not on file     Attends meetings of clubs or organizations: Not on file     Relationship status: Not on file   ? Intimate partner violence     Fear of current or ex partner: Not on file     Emotionally abused: Not on file     Physically abused: Not on file     Forced sexual activity: Not on file   Other Topics Concern   ? Not on file   Social History Narrative   ? Not on file       ROS:  10 point ROS of systems including  "Constitutional, Eyes, Respiratory, Cardiovascular, Gastroenterology, Genitourinary, Integumentary, Musculoskeletal, Psychiatric were all negative except for pertinent positives noted in my HPI.  Review of Systems    EXAM:  Vitals:    05/18/21 1207   BP: 141/54   Pulse: 98   Resp: 18   Temp: 97.8  F (36.6  C)   SpO2: 97%   Weight: 117 lb 6.4 oz (53.3 kg)   Height: 5' 5\" (1.651 m)     GENERAL APPEARANCE:  Alert, in no distress, appears healthy, oriented, thin, cooperative, elderly woman resting in bed  EYES:  EOM, conjunctivae, lids, pupils and irises normal, wears glasses  RESP:  Respiratory effort and palpation of chest normal, lungs clear to auscultation, no respiratory distress  CV:  Palpation and auscultation of heart done, regular rate and rhythm, no murmur, rub, or gallop, no edema, +2 pedal pulses  ABDOMEN: Normal bowel sounds, soft, nontender, no hepatosplenomegaly or other masses  M/S:   Gait and station abnormal - STARR 2/2 patient being in bed; Digits and nails abnormal - arthritic changes present  SKIN:  Palpation of skin and subcutaneous tissue baseline, Inspection of skin and subcutaneous tissue normal. Skin thin and dry.  NEURO: Cranial nerves 2-12 are normal tested and grossly at patient's baseline  PSYCH: Oriented X 3, normal insight, judgement and memory, affect and mood normal      LABS:   Reviewed in Epic   Ref. Range 5/16/2021 09:05 5/18/2021 05:52   Sodium Latest Ref Range: 136 - 145 mmol/L  140   Potassium Latest Ref Range: 3.5 - 5.0 mmol/L  5.2 (H)   Chloride Latest Ref Range: 98 - 107 mmol/L  110 (H)   CO2 Latest Ref Range: 22 - 31 mmol/L  23   Anion Gap, Calculation Latest Ref Range: 5 - 18 mmol/L  7   BUN Latest Ref Range: 8 - 28 mg/dL  39 (H)   Creatinine Latest Ref Range: 0.60 - 1.10 mg/dL  1.65 (H)   GFR MDRD Af Amer Latest Ref Range: >60 mL/min/1.73m2  36 (L)   GFR MDRD Non Af Amer Latest Ref Range: >60 mL/min/1.73m2  30 (L)   Calcium Latest Ref Range: 8.5 - 10.5 mg/dL  9.1   AST Latest " Ref Range: 0 - 40 U/L  16   ALT Latest Ref Range: 0 - 45 U/L  21   ALBUMIN Latest Ref Range: 3.5 - 5.0 g/dL  2.2 (L)   Protein, Total Latest Ref Range: 6.0 - 8.0 g/dL  4.5 (L)   Alkaline Phosphatase Latest Ref Range: 45 - 120 U/L  70   Bilirubin, Total Latest Ref Range: 0.0 - 1.0 mg/dL  0.2   Iron Latest Ref Range: 42 - 175 ug/dL  24 (L)   Glucose Latest Ref Range: 70 - 125 mg/dL  85   Folate Latest Ref Range: >=3.5 ng/mL  9.3   Vitamin B-12 Latest Ref Range: 213 - 816 pg/mL  270   Transferrin Latest Ref Range: 212 - 360 mg/dL  215   Transferrin IBC, Calculated Latest Ref Range: 313 - 563 ug/dL  269 (L)   Transferrin Saturation, Calculated Latest Ref Range: 20 - 50 %  9 (L)   WBC Latest Ref Range: 4.0 - 11.0 thou/uL  3.6 (L)   RBC Latest Ref Range: 3.80 - 5.40 mill/uL  2.50 (L)   Hemoglobin Latest Ref Range: 12.0 - 16.0 g/dL 7.9 (L) 7.0 (L)   Hematocrit Latest Ref Range: 35.0 - 47.0 %  23.0 (L)   MCV Latest Ref Range: 80 - 100 fL  92   MCH Latest Ref Range: 27.0 - 34.0 pg  28.0   MCHC Latest Ref Range: 32.0 - 36.0 g/dL  30.4 (L)   RDW Latest Ref Range: 11.0 - 14.5 %  16.9 (H)   Platelets Latest Ref Range: 140 - 440 thou/uL  280   MPV Latest Ref Range: 8.5 - 12.5 fL  11.6       ASSESSMENT/PLAN:      ICD-10-CM    1. Anemia, unspecified type  D64.9 Acute on chronic, unstable. Continues to be   2. FTT (failure to thrive) in adult  R62.7 Variable. Is up today to 7.9 from 7.0; Iron today noted at 24 along with other iron labs lower than desired. Will start on regimen of FeSO4 qMWF along with Vit C 500mg PO qDay - plan to recheck Hgb and Iron level on 5/21/21.   3. Edema of both lower extremities  R60.0 Acute on chronic, unstable. Increased edema as above - will restart PTA regimen of Lasix 10mg PO qDay with ongoing monitoring   4. Hyperkalemia  E87.5 Acute, unstable. Coincidentally noted with today's labs - Restarting Lasix as noted above; will plan to recheck BMP on 5/21/21.       Electronically signed by:   Dr. Cutler  LEONARDO Torres, TESSA  [facility]  Office: [facility] Office: [address] [place] [address]    [place] Cell: [phone]  [place] Fax: [phone]    [facility] Phone: [phone]  [facility] Voicemail: [phone]

## 2021-07-05 PROBLEM — E44.0 MODERATE PROTEIN-CALORIE MALNUTRITION (H): Status: ACTIVE | Noted: 2021-06-15

## 2021-07-09 ENCOUNTER — OFFICE VISIT - HEALTHEAST (OUTPATIENT)
Dept: GERIATRICS | Facility: CLINIC | Age: 81
End: 2021-07-09

## 2021-07-09 DIAGNOSIS — K21.9 GASTROESOPHAGEAL REFLUX DISEASE WITHOUT ESOPHAGITIS: ICD-10-CM

## 2021-07-09 DIAGNOSIS — R11.0 NAUSEA: ICD-10-CM

## 2021-07-09 DIAGNOSIS — D64.9 ANEMIA, UNSPECIFIED TYPE: ICD-10-CM

## 2021-07-20 ENCOUNTER — DISCHARGE SUMMARY NURSING HOME (OUTPATIENT)
Dept: GERIATRICS | Facility: CLINIC | Age: 81
End: 2021-07-20
Payer: COMMERCIAL

## 2021-07-20 VITALS
HEIGHT: 60 IN | TEMPERATURE: 96.9 F | OXYGEN SATURATION: 97 % | DIASTOLIC BLOOD PRESSURE: 56 MMHG | SYSTOLIC BLOOD PRESSURE: 108 MMHG | BODY MASS INDEX: 22.54 KG/M2 | WEIGHT: 114.8 LBS | HEART RATE: 80 BPM | RESPIRATION RATE: 20 BRPM

## 2021-07-20 DIAGNOSIS — M54.50 CHRONIC MIDLINE LOW BACK PAIN WITHOUT SCIATICA: ICD-10-CM

## 2021-07-20 DIAGNOSIS — K21.9 GASTROESOPHAGEAL REFLUX DISEASE WITHOUT ESOPHAGITIS: ICD-10-CM

## 2021-07-20 DIAGNOSIS — B02.29 POST HERPETIC NEURALGIA: ICD-10-CM

## 2021-07-20 DIAGNOSIS — R62.7 ADULT FAILURE TO THRIVE: Primary | ICD-10-CM

## 2021-07-20 DIAGNOSIS — M62.81 GENERALIZED MUSCLE WEAKNESS: ICD-10-CM

## 2021-07-20 DIAGNOSIS — E87.1 HYPONATREMIA: ICD-10-CM

## 2021-07-20 DIAGNOSIS — D64.9 ANEMIA, UNSPECIFIED TYPE: ICD-10-CM

## 2021-07-20 DIAGNOSIS — G89.29 CHRONIC MIDLINE LOW BACK PAIN WITHOUT SCIATICA: ICD-10-CM

## 2021-07-20 DIAGNOSIS — F33.1 MODERATE EPISODE OF RECURRENT MAJOR DEPRESSIVE DISORDER (H): ICD-10-CM

## 2021-07-20 DIAGNOSIS — N18.4 CHRONIC KIDNEY DISEASE, STAGE IV (SEVERE) (H): ICD-10-CM

## 2021-07-20 PROBLEM — M54.40 LUMBAGO WITH SCIATICA: Status: ACTIVE | Noted: 2019-04-02

## 2021-07-20 PROBLEM — G47.00 INSOMNIA: Status: ACTIVE | Noted: 2018-04-06

## 2021-07-20 PROBLEM — E78.5 HYPERLIPIDEMIA: Status: ACTIVE | Noted: 2018-04-06

## 2021-07-20 PROBLEM — N17.9 AKI (ACUTE KIDNEY INJURY) (H): Status: ACTIVE | Noted: 2020-04-06

## 2021-07-20 PROBLEM — R79.89 SERUM CREATININE RAISED: Status: ACTIVE | Noted: 2019-08-19

## 2021-07-20 PROBLEM — E55.9 VITAMIN D DEFICIENCY: Status: ACTIVE | Noted: 2018-04-06

## 2021-07-20 PROBLEM — R60.0 EDEMA OF LOWER EXTREMITY: Status: ACTIVE | Noted: 2018-11-20

## 2021-07-20 PROBLEM — H61.20 CERUMEN IN AUDITORY CANAL ON EXAMINATION: Status: ACTIVE | Noted: 2021-04-26

## 2021-07-20 PROBLEM — H92.01 RIGHT EAR PAIN: Status: ACTIVE | Noted: 2021-04-27

## 2021-07-20 PROBLEM — R26.89 IMPAIRMENT OF BALANCE: Status: ACTIVE | Noted: 2020-09-04

## 2021-07-20 PROBLEM — M81.0 AGE-RELATED OSTEOPOROSIS WITHOUT CURRENT PATHOLOGICAL FRACTURE: Status: ACTIVE | Noted: 2018-08-16

## 2021-07-20 PROCEDURE — 99316 NF DSCHRG MGMT 30 MIN+: CPT | Performed by: NURSE PRACTITIONER

## 2021-07-20 ASSESSMENT — MIFFLIN-ST. JEOR: SCORE: 902.23

## 2021-07-20 NOTE — LETTER
7/20/2021        RE: Abeba Martinez  [address]  [facility] [address]        [facility] DIS[facility]  Patient Name: Abeba Martinez  YOB: 1940  [place] Medical Record Number: [id number]  Place of Service Where Encounter Took Place: [facility] () [[id number]]    PRIMARY CARE PROVIDER AND CLINIC RESPONSIBLE AFTER TRANSFER: LEONARDO Jones CNP, [address] / [place] [address]; Phone: [phone]; Fax: [phone]; [facility] Provider     Transferring providers: LEONARDO Domínguez CNP; Trista Arenas MD  Recent Hospitalization/ED: [facility]  stay 6/16/21 to 6/25/21.  Date of SNF Admission: 6/25/2021  Date of SNF (anticipated) Discharge: July 26, 2021  Discharged to: new assisted living for patient [facility]  Cognitive Scores: BIMS: 13/15 and SLUMS: 26/30  Physical Function: Ambulating 100+ ft with FWW  DME: Hospital Bed provided by family    CODE STATUS/ADVANCE DIRECTIVES DISCUSSION: No Order - DNR/DNI  ALLERGIES: Esomeprazole    NURSING FACILITY COURSE   Medication Changes/Rationale:     7/9/21 - Zofran 4mg PO q6h PRN - nausea    Summary of nursing facility stay:     ICD-10-CM    1. Adult failure to thrive  R62.7 Acute on chronic - stable. Patient's weight remaining stable during time in facility with provided meals and supplementation. Last albumin:  Lab Results   Component Value Date    ALBUMIN 2.5 06/08/2021     Would recommend ongoing monitoring and supplementation - follow-up with new PCP for ongoing management as needed.    2. Generalized muscle weakness  M62.81 Acute on chronic - improving. Patient improving through work with therapies since re-admission to facility. Now able to ambulate greater than 100' with use of walker along with increased indpenendence with ADLs - Recommend ongoing PT/OT per home care for ongoing strengthening and safety in new environment.   3. Gastroesophageal reflux disease without esophagitis  K21.9 Chronic -  stable? Recent complaints of nausea without source noted - started effectively on regimen of PRN Zofran. Does continue on regimen of Pepcid qDay. Monitor closely with change in situation, has been hospitalized secondary to this in the past per her report. Denies active concerns today. Continue medications as ordered and follow-up with new PCP for ongoing monitoring and management.    4. Unilateral post herpetic neuralgia  B02.29 Chronic - stable. Patient previously presented to facility in TCU with unidentifiable rash across side of face and onto neck - deemed Shingles, effectively treated and resolved at this time. Does continue with ongoing herpatic pain effectively controlled on regimen of Tylenol and Gabapentin - Continue medications as ordered; follow-up with new PCP for ongoing monitoring and management.   5. Hyponatremia  E87.1 Acute on chronic - stable. Variable throughout stay; does become lethargic/confused when levels drops. Avg Na+ level ~140-144 baseline. No current treatment regimen. Monitor and follow-up with PCP for ongoing lab monitoring PRN.   6. Moderate episode of recurrent major depressive disorder (H)  F33.1 Chronic - stable. Resident denies active depressive symptoms at this time. Previously prescribed Remeron discontinued during prior hospitalization with no noted change in status - does continue on regimen of Trazodone at bedtime. Last PHQ9: 03/27. Continue medications as ordered with ongoing monitoring and management per new PCP.   7. Anemia, unspecified type  D64.9 Chronic - stable? Patient with low-Hgb levels and high variability. Experiences drops in Hgb without cause or symptoms and will re-elevate without treatment. Most recent Hgb 7.8 - Avg Hgb 7.2-8.0; has not received a transfusion as follow-up labs place her above 7.8. Previously on iron supplementation + Vitamin C, this was stopped most recent hospitalization given stability within her baseline. Recommend ongoing monitoring and  re-starting of supplementation should patient become symptomatic.   8. Chronic midline low back pain without sciatica  M54.5 Chronic - stable. Patient with chronic LBP controlled at baseline at this time on regimen of    G89.29 Tylenol - prior issues with opioids, thus these are to be avoided unless absolutely necessary. Continue medications as ordered with therapies as noted above. Follow-up with PCP for ongoing monitoring and management.   9. Chronic kidney disease, stage IV (severe) (H)  N18.4 Chronic - stable. Patient voiding without concern. Baseline Crt ~1.4-1.6 with GFR of ~32 and BUN of ~33. Continue to encourage PO intake with ongoing lab monitoring per PCP.          Discharge Medications:  Current Outpatient Medications   Medication Sig Dispense Refill     acetaminophen (TYLENOL) 500 MG tablet [ACETAMINOPHEN (TYLENOL) 500 MG TABLET] Take 1,000 mg by mouth 3 (three) times a day.       famotidine (PEPCID) 40 MG tablet [FAMOTIDINE (PEPCID) 40 MG TABLET] Take 40 mg by mouth daily.       gabapentin (NEURONTIN) 300 MG capsule [GABAPENTIN (NEURONTIN) 300 MG CAPSULE] Take 300 mg by mouth 2 (two) times a day.       MULTIVITAMIN ORAL [MULTIVITAMIN ORAL] Take 1 tablet by mouth daily.       ondansetron (ZOFRAN) 4 MG tablet Take 1 tablet (4 mg) by mouth every 6 hours as needed for nausea       traZODone (DESYREL) 150 MG tablet [TRAZODONE (DESYREL) 150 MG TABLET] Take 150 mg by mouth at bedtime.        Controlled medications:   not applicable/none     Past Medical History:   Past Medical History:   Diagnosis Date     Adult failure to thrive      PEDRO (acute kidney injury) (H)      Altered mental status      Chronic low back pain      CKD (chronic kidney disease)      Elevated serum creatinine      GERD (gastroesophageal reflux disease)      Lumbar canal stenosis      Milk alkali syndrome      Physical Exam:   Vitals: /56   Pulse 80   Temp 96.9  F (36.1  C)   Resp 20   Ht 1.524 m (5')   Wt 52.1 kg (114 lb 12.8  oz)   SpO2 97%   BMI 22.42 kg/m    BMI= Body mass index is 22.42 kg/m .  GENERAL APPEARANCE:  Alert, in no distress, appears healthy, oriented, thin, cooperative, elderly woman in room  EYES:  EOM, conjunctivae, lids, pupils and irises normal  RESP: Respiratory effort and palpation of chest normal, lungs clear to auscultation, no respiratory distress  CV:  Palpation and auscultation of heart done, regular rate and rhythm, no murmur, rub, or gallop, no edema, +2 pedal pulses  ABDOMEN: Normal bowel sounds, soft, nontender, no hepatosplenomegaly or other masses  M/S:   Gait and station abnormal - uses RW; Digits and nails abnormal - arthritic changes present  SKIN:  Palpation of skin and subcutaneous tissue baseline, Inspection of skin and subcutaneous tissue normal; skin thin, fragile and dry  NEURO:   Cranial nerves 2-12 are normal tested and grossly at patient's baseline  PSYCH:  oriented X 3, normal insight, judgement and memory, affect and mood normal    SNF labs: Recent labs in [facility] reviewed by me today.     DISCHARGE PLAN:    Follow up labs: No labs orders/due    Medical Follow Up:     Follow up with PCP in 7-10 days  Current [place] scheduled appointments: None.    Discharge Services: Home Care:  Occupational Therapy, Physical Therapy, Registered Nurse, Home Health Aide,  and From:  [facility]    Discharge Instructions Verbalized to Patient at Discharge:     None    TOTAL DISCHARGE TIME: Greater than 30 minutes  Electronically signed by:  Dr. Omayra Torres, LEONARDO, [facility] Services[facility]  Office: [address] [place] [address] [address] [place] [address]  [place] Cell: [phone]  [place] Fax: [phone]  [facility] Phone: [phone]  [facility] Voicemail: [phone]    Email: [email or web address]         Documentation of Face to Face and Certification for Home Health Services    I certify that patient: Abeba Martinez is under my care and that I, or a nurse practitioner or  physician's assistant working with me, had a face-to-face encounter that meets the physician face-to-face encounter requirements with this patient on: 7/20/2021.    This encounter with the patient was in whole, or in part, for the following medical condition, which is the primary reason for home health care: The primary encounter diagnosis was Adult failure to thrive. Diagnoses of Generalized muscle weakness, Gastroesophageal reflux disease without esophagitis, Unilateral post herpetic neuralgia, Hyponatremia, Moderate episode of recurrent major depressive disorder (H), Anemia, unspecified type, Chronic midline low back pain without sciatica, and Chronic kidney disease, stage IV (severe) (H) were also pertinent to this visit.    I certify that, based on my findings, the following services are medically necessary home health services: Nursing, Occupational Therapy, Physical Therapy, Social Work and Home health aide.    My clinical findings support the need for the above services because: Nurse is needed: To provide assessment and oversight required in the home to assure adherence to the medical plan due to: medical complexity and change in living situation 2/2 above noted diagnoses.., Occupational Therapy Services are needed to assess and treat cognitive ability and address ADL safety due to impairment in physical abilities 2/2 above noted diagnoses., Physical Therapy Services are needed to assess and treat the following functional impairments: Physical limitiations 2/2 above noted diagnoses. and Home health aide to assist with ADLs as deemed necessary; Social work to assist with social concerns 2/2 above noted diagnoses as appropriate.    Further, I certify that my clinical findings support that this patient is homebound (i.e. absences from home require considerable and taxing effort and are for medical reasons or [place] services or infrequently or of short duration when for other reasons) because: Requires  assistance of another person or specialized equipment to access medical services because patient: Is unable to walk greater than 150 feet without rest...    Based on the above findings. I certify that this patient is confined to the home and needs intermittent skilled nursing care, physical therapy and/or speech therapy.  The patient is under my care, and I have initiated the establishment of the plan of care.  This patient will be followed by a physician who will periodically review the plan of care.  Physician/Provider to provide follow up care: Laura Torres    Attending hospital physician (the Medicare certified [place] provider): Dr.Sara Deepa MD, signing F2F and only signing for initial order. Please send all follow up questions and concerns or needed follow up signatures to the PCP, who [place] has on file as:  Laura Torres.    Physician Signature: See electronic signature associated with these discharge orders.  Date: 2021    [place] [facility] Discharge Orders    Name: Abeba Martinez  : 1940  Planned Discharge Date: 21  Discharged to: new assisted living for patient [facility]    MEDICAL FOLLOW UP  Follow up with PCP in 7-10 day  Follow up with Specialists - None      FUTURE LABS: No labs orders/due    ORDER CHANGES:  None    DISCHARGE MEDICATIONS:  The patient s pharmacy is authorized to dispense a 30-day supply of medications. Refill requests should be directed to the primary provider, Laura Torres.   No narcotics are prescribed at time of discharge.   Current Outpatient Medications   Medication Sig Dispense Refill     acetaminophen (TYLENOL) 500 MG tablet [ACETAMINOPHEN (TYLENOL) 500 MG TABLET] Take 1,000 mg by mouth 3 (three) times a day.       famotidine (PEPCID) 40 MG tablet [FAMOTIDINE (PEPCID) 40 MG TABLET] Take 40 mg by mouth daily.       gabapentin (NEURONTIN) 300 MG capsule [GABAPENTIN (NEURONTIN) 300 MG CAPSULE] Take 300 mg by mouth 2 (two) times a day.        MULTIVITAMIN ORAL [MULTIVITAMIN ORAL] Take 1 tablet by mouth daily.       ondansetron (ZOFRAN) 4 MG tablet Take 1 tablet (4 mg) by mouth every 6 hours as needed for nausea       traZODone (DESYREL) 150 MG tablet [TRAZODONE (DESYREL) 150 MG TABLET] Take 150 mg by mouth at bedtime.          SERVICES:  Home Care:  Occupational Therapy, Physical Therapy, Registered Nurse, Home Health Aide,  and From:  [facility]    ADDITIONAL INSTRUCTIONS:  None    LEONARDO Woodson, [facility]  Office: [address] [place] [address] [address] [place] [address]  [place] Cell: [phone]  [place] Fax: [phone]  [facility] Phone: [phone]  [facility] Voicemail: [phone]    Email: [email or web address]       This document was electronically signed on July 26, 2021          Sincerely,        LEONARDO Jones CNP

## 2021-07-20 NOTE — PROGRESS NOTES
[facility] GERIATRIC SERVICES DISCHARGE SUMMARY  Patient Name: Abeba Martinez  YOB: 1940  [place] Medical Record Number: [id number]  Place of Service Where Encounter Took Place: [facility] () [[id number]]    PRIMARY CARE PROVIDER AND CLINIC RESPONSIBLE AFTER TRANSFER: LEONARDO Jones CNP, [address] / [place] [address]; Phone: [phone]; Fax: [phone]; [facility] Provider     Transferring providers: LEONARDO Domínguez CNP; Trista Arenas MD  Recent Hospitalization/ED: Hospital  [facility]  stay 6/16/21 to 6/25/21.  Date of SNF Admission: 6/25/2021  Date of SNF (anticipated) Discharge: July 26, 2021  Discharged to: new assisted living for patient [facility]  Cognitive Scores: BIMS: 13/15 and SLUMS: 26/30  Physical Function: Ambulating 100+ ft with FWW  DME: Hospital Bed provided by family    CODE STATUS/ADVANCE DIRECTIVES DISCUSSION: No Order - DNR/DNI  ALLERGIES: Esomeprazole    NURSING FACILITY COURSE   Medication Changes/Rationale:     7/9/21 - Zofran 4mg PO q6h PRN - nausea    Summary of nursing facility stay:     ICD-10-CM    1. Adult failure to thrive  R62.7 Acute on chronic - stable. Patient's weight remaining stable during time in facility with provided meals and supplementation. Last albumin:  Lab Results   Component Value Date    ALBUMIN 2.5 06/08/2021     Would recommend ongoing monitoring and supplementation - follow-up with new PCP for ongoing management as needed.    2. Generalized muscle weakness  M62.81 Acute on chronic - improving. Patient improving through work with therapies since re-admission to facility. Now able to ambulate greater than 100' with use of walker along with increased indpenendence with ADLs - Recommend ongoing PT/OT per home care for ongoing strengthening and safety in new environment.   3. Gastroesophageal reflux disease without esophagitis  K21.9 Chronic - stable? Recent complaints of nausea without source noted - started effectively on regimen  of PRN Zofran. Does continue on regimen of Pepcid qDay. Monitor closely with change in situation, has been hospitalized secondary to this in the past per her report. Denies active concerns today. Continue medications as ordered and follow-up with new PCP for ongoing monitoring and management.    4. Unilateral post herpetic neuralgia  B02.29 Chronic - stable. Patient previously presented to facility in TCU with unidentifiable rash across side of face and onto neck - deemed Shingles, effectively treated and resolved at this time. Does continue with ongoing herpatic pain effectively controlled on regimen of Tylenol and Gabapentin - Continue medications as ordered; follow-up with new PCP for ongoing monitoring and management.   5. Hyponatremia  E87.1 Acute on chronic - stable. Variable throughout stay; does become lethargic/confused when levels drops. Avg Na+ level ~140-144 baseline. No current treatment regimen. Monitor and follow-up with PCP for ongoing lab monitoring PRN.   6. Moderate episode of recurrent major depressive disorder (H)  F33.1 Chronic - stable. Resident denies active depressive symptoms at this time. Previously prescribed Remeron discontinued during prior hospitalization with no noted change in status - does continue on regimen of Trazodone at bedtime. Last PHQ9: 03/27. Continue medications as ordered with ongoing monitoring and management per new PCP.   7. Anemia, unspecified type  D64.9 Chronic - stable? Patient with low-Hgb levels and high variability. Experiences drops in Hgb without cause or symptoms and will re-elevate without treatment. Most recent Hgb 7.8 - Avg Hgb 7.2-8.0; has not received a transfusion as follow-up labs place her above 7.8. Previously on iron supplementation + Vitamin C, this was stopped most recent hospitalization given stability within her baseline. Recommend ongoing monitoring and re-starting of supplementation should patient become symptomatic.   8. Chronic midline low  back pain without sciatica  M54.5 Chronic - stable. Patient with chronic LBP controlled at baseline at this time on regimen of    G89.29 Tylenol - prior issues with opioids, thus these are to be avoided unless absolutely necessary. Continue medications as ordered with therapies as noted above. Follow-up with PCP for ongoing monitoring and management.   9. Chronic kidney disease, stage IV (severe) (H)  N18.4 Chronic - stable. Patient voiding without concern. Baseline Crt ~1.4-1.6 with GFR of ~32 and BUN of ~33. Continue to encourage PO intake with ongoing lab monitoring per PCP.          Discharge Medications:  Current Outpatient Medications   Medication Sig Dispense Refill     acetaminophen (TYLENOL) 500 MG tablet [ACETAMINOPHEN (TYLENOL) 500 MG TABLET] Take 1,000 mg by mouth 3 (three) times a day.       famotidine (PEPCID) 40 MG tablet [FAMOTIDINE (PEPCID) 40 MG TABLET] Take 40 mg by mouth daily.       gabapentin (NEURONTIN) 300 MG capsule [GABAPENTIN (NEURONTIN) 300 MG CAPSULE] Take 300 mg by mouth 2 (two) times a day.       MULTIVITAMIN ORAL [MULTIVITAMIN ORAL] Take 1 tablet by mouth daily.       ondansetron (ZOFRAN) 4 MG tablet Take 1 tablet (4 mg) by mouth every 6 hours as needed for nausea       traZODone (DESYREL) 150 MG tablet [TRAZODONE (DESYREL) 150 MG TABLET] Take 150 mg by mouth at bedtime.        Controlled medications:   not applicable/none     Past Medical History:   Past Medical History:   Diagnosis Date     Adult failure to thrive      PEDRO (acute kidney injury) (H)      Altered mental status      Chronic low back pain      CKD (chronic kidney disease)      Elevated serum creatinine      GERD (gastroesophageal reflux disease)      Lumbar canal stenosis      Milk alkali syndrome      Physical Exam:   Vitals: /56   Pulse 80   Temp 96.9  F (36.1  C)   Resp 20   Ht 1.524 m (5')   Wt 52.1 kg (114 lb 12.8 oz)   SpO2 97%   BMI 22.42 kg/m    BMI= Body mass index is 22.42 kg/m .  GENERAL  APPEARANCE:  Alert, in no distress, appears healthy, oriented, thin, cooperative, elderly woman in room  EYES:  EOM, conjunctivae, lids, pupils and irises normal  RESP: Respiratory effort and palpation of chest normal, lungs clear to auscultation, no respiratory distress  CV:  Palpation and auscultation of heart done, regular rate and rhythm, no murmur, rub, or gallop, no edema, +2 pedal pulses  ABDOMEN: Normal bowel sounds, soft, nontender, no hepatosplenomegaly or other masses  M/S:   Gait and station abnormal - uses RW; Digits and nails abnormal - arthritic changes present  SKIN:  Palpation of skin and subcutaneous tissue baseline, Inspection of skin and subcutaneous tissue normal; skin thin, fragile and dry  NEURO:   Cranial nerves 2-12 are normal tested and grossly at patient's baseline  PSYCH:  oriented X 3, normal insight, judgement and memory, affect and mood normal    SNF labs: Recent labs in [facility] reviewed by me today.     DISCHARGE PLAN:    Follow up labs: No labs orders/due    Medical Follow Up:     Follow up with PCP in 7-10 days  Current [place] scheduled appointments: None.    Discharge Services: Home Care:  Occupational Therapy, Physical Therapy, Registered Nurse, Home Health Aide,  and From:  [facility]    Discharge Instructions Verbalized to Patient at Discharge:     None    TOTAL DISCHARGE TIME: Greater than 30 minutes  Electronically signed by:  LEONARDO Woodson, [facility]  Office: [facility] [address] [place] [address]  [place] Cell: [phone]  [place] Fax: [phone]  [facility] Phone: [phone]  [facility] Voicemail: [phone]    Email: [email or web address]         Documentation of Face to Face and Certification for Home Health Services    I certify that patient: Abeba Martinez is under my care and that I, or a nurse practitioner or physician's assistant working with me, had a face-to-face encounter that meets the  physician face-to-face encounter requirements with this patient on: 7/20/2021.    This encounter with the patient was in whole, or in part, for the following medical condition, which is the primary reason for home health care: The primary encounter diagnosis was Adult failure to thrive. Diagnoses of Generalized muscle weakness, Gastroesophageal reflux disease without esophagitis, Unilateral post herpetic neuralgia, Hyponatremia, Moderate episode of recurrent major depressive disorder (H), Anemia, unspecified type, Chronic midline low back pain without sciatica, and Chronic kidney disease, stage IV (severe) (H) were also pertinent to this visit.    I certify that, based on my findings, the following services are medically necessary home health services: Nursing, Occupational Therapy, Physical Therapy, Social Work and Home health aide.    My clinical findings support the need for the above services because: Nurse is needed: To provide assessment and oversight required in the home to assure adherence to the medical plan due to: medical complexity and change in living situation 2/2 above noted diagnoses.., Occupational Therapy Services are needed to assess and treat cognitive ability and address ADL safety due to impairment in physical abilities 2/2 above noted diagnoses., Physical Therapy Services are needed to assess and treat the following functional impairments: Physical limitiations 2/2 above noted diagnoses. and Home health aide to assist with ADLs as deemed necessary; Social work to assist with social concerns 2/2 above noted diagnoses as appropriate.    Further, I certify that my clinical findings support that this patient is homebound (i.e. absences from home require considerable and taxing effort and are for medical reasons or [facility] services or infrequently or of short duration when for other reasons) because: Requires assistance of another person or specialized equipment to access medical services because  patient: Is unable to walk greater than 150 feet without rest...    Based on the above findings. I certify that this patient is confined to the home and needs intermittent skilled nursing care, physical therapy and/or speech therapy.  The patient is under my care, and I have initiated the establishment of the plan of care.  This patient will be followed by a physician who will periodically review the plan of care.  Physician/Provider to provide follow up care: Laura Torres    Attending hospital physician (the Medicare certified [facility] provider): Dr.Sara Deepa MD, signing F2F and only signing for initial order. Please send all follow up questions and concerns or needed follow up signatures to the PCP, who [place] has on file as:  Laura Torres.    Physician Signature: See electronic signature associated with these discharge orders.  Date: 7/26/2021

## 2021-07-22 NOTE — PROGRESS NOTES
[facility]    Facility:   [facility] [[id number]]   Code Status: DNR/DNI      CHIEF COMPLAINT/REASON FOR VISIT:  Chief Complaint   Patient presents with     Follow Up       HISTORY:      HPI: Abeba is a 80 y.o. female  has a past medical history of Adult failure to thrive, PEDRO (acute kidney injury) (H), Altered mental status, Chronic low back pain, CKD (chronic kidney disease), Elevated serum creatinine, GERD (gastroesophageal reflux disease), Lumbar canal stenosis, and Milk alkali syndrome.  Resident seen today to follow-up on ongoing anemia with variable hemoglobin levels.  Currently continue to await today's lab results.  She does note that she is feeling fine, does not note any change in her physiological status, which is down from a prior to hospitalized.  She is sleeping well, sometimes too much.  Continues to work with therapies, ongoing plans to transition to assist living facility.  Family present today visit, requesting a letter from writer noting that her inability to live independently ongoing to aid in breaking her lease at her current and.  No other acute concerns voiced today.    Past Medical History:   Diagnosis Date     Adult failure to thrive      PEDRO (acute kidney injury) (H)      Altered mental status      Chronic low back pain      CKD (chronic kidney disease)      Elevated serum creatinine      GERD (gastroesophageal reflux disease)      Lumbar canal stenosis      Milk alkali syndrome              Family History   Problem Relation Age of Onset     Alcohol abuse Father      Lung cancer Father      Diabetes Father      Depression Brother         suicide     Social History     Socioeconomic History     Marital status:      Spouse name: Not on file     Number of children: Not on file     Years of education: Not on file     Highest education level: Not on file   Occupational History     Not on file   Social Needs     Financial resource strain: Not on file  "    Food insecurity     Worry: Not on file     Inability: Not on file     Transportation needs     Medical: Not on file     Non-medical: Not on file   Tobacco Use     Smoking status: Former Smoker     Packs/day: 0.50     Types: Cigarettes     Smokeless tobacco: Never Used   Substance and Sexual Activity     Alcohol use: Yes     Drug use: Not on file     Sexual activity: Not on file   Lifestyle     Physical activity     Days per week: Not on file     Minutes per session: Not on file     Stress: Not on file   Relationships     Social connections     Talks on phone: Not on file     Gets together: Not on file     Attends Hinduism service: Not on file     Active member of club or organization: Not on file     Attends meetings of clubs or organizations: Not on file     Relationship status: Not on file     Intimate partner violence     Fear of current or ex partner: Not on file     Emotionally abused: Not on file     Physically abused: Not on file     Forced sexual activity: Not on file   Other Topics Concern     Not on file   Social History Narrative     Not on file       ROS:  10 point ROS of systems including Constitutional, Eyes, Respiratory, Cardiovascular, Gastroenterology, Genitourinary, Integumentary, Musculoskeletal, Psychiatric were all negative except for pertinent positives noted in my HPI.    EXAM:  Vitals:    05/21/21 1031   BP: 99/56   Pulse: 89   Resp: 18   Temp: 98.1  F (36.7  C)   SpO2: 99%   Weight: 117 lb 6.4 oz (53.3 kg)   Height: 5' 5\" (1.651 m)     GENERAL APPEARANCE:  Alert, in no distress, thin, cooperative  RESP:  no respiratory distress, cough -None  CV:  no edema  M/S:   Gait and station abnormal -Wheelchair use for long distances, using front wheeled walker in room  Digits and nails abnormal -Arthritic changes present  SKIN:  Inspection of skin and subcutaneous tissue baseline, Skin thin, fragile, and dry  PSYCH:  oriented X 3, normal insight, judgement and memory, affect and mood normal    "   LABS:   Awaiting today's labs    ASSESSMENT/PLAN:      ICD-10-CM    1. Anemia, unspecified type  D64.9  acute on chronic-unstable.  Continue to await today's lab work at end of business day.  No report from facility, labs not available in computer.  We will continue to assess and intervene as appropriate.   2. FTT (failure to thrive) in adult  R62.7  acute on chronic-unstable.  Resident    3. Alteration in performance of activities of daily living  Z78.9  currently in long-term care bed and facility while awaiting placement in assisted living facility once paperwork is appropriately processed.  She was deemed unsafe to reside in independent living anymore.  Family requesting letter to provide [employer] to allow patient out of her lease.  See attached documentation.     Electronically signed by:   LEONARDO Woodson, [facility]  Office: [facility] Office: [address] [place] [address]    [place] Cell: [phone]  [place] Fax: [phone]    [facility] Phone: [phone]  [facility] Voicemail: [phone]

## 2021-07-22 NOTE — PROGRESS NOTES
"[facility]     Facility:   [facility] [[id number]]   Code Status: DNR/DNI      CHIEF COMPLAINT/REASON FOR VISIT:  Chief Complaint   Patient presents with     Follow-up       HISTORY:      HPI: Abeba is a 80 y.o. female  has a past medical history of Adult failure to thrive, PEDRO (acute kidney injury) (H), Altered mental status, Chronic low back pain, CKD (chronic kidney disease), Elevated serum creatinine, GERD (gastroesophageal reflux disease), Lumbar canal stenosis, and Milk alkali syndrome. Patient seen today for a follow-up visit in TCU. Patient notes things are going okay, she is seen today resting. She notes she is feeling \"pretty good\". Therapies are going ok. Appetite is down, but she feels like it's improving. She is sleeping well. Denies CP, palpitations, fatigue, nausea, vomiting, increased SOB/GONZALES, fever, chills, and/or b/b concerns today.    Labs completed yesterday and not called to provider note a drop in the patient's Hgb as shown below - we discuss the potential for a transfusion and she is willing to undergo this should it be needed if her Hgb becomes <7.0; she does note to bei2ng a bit more fatigued than before but otherwise denies overt sx.     Ref. Range 5/3/2021 08:41 5/13/2021 05:59   Hemoglobin Latest Ref Range: 12.0 - 16.0 g/dL 9.0 (L) 7.2 (L)         Past Medical History:   Diagnosis Date     Adult failure to thrive      PEDRO (acute kidney injury) (H)      Altered mental status      Chronic low back pain      CKD (chronic kidney disease)      Elevated serum creatinine      GERD (gastroesophageal reflux disease)      Lumbar canal stenosis      Milk alkali syndrome              Family History   Problem Relation Age of Onset     Alcohol abuse Father      Lung cancer Father      Diabetes Father      Depression Brother         suicide     Social History     Socioeconomic History     Marital status:      Spouse name: Not on file     Number of children: " "Not on file     Years of education: Not on file     Highest education level: Not on file   Occupational History     Not on file   Social Needs     Financial resource strain: Not on file     Food insecurity     Worry: Not on file     Inability: Not on file     Transportation needs     Medical: Not on file     Non-medical: Not on file   Tobacco Use     Smoking status: Former Smoker     Packs/day: 0.50     Types: Cigarettes     Smokeless tobacco: Never Used   Substance and Sexual Activity     Alcohol use: Yes     Drug use: Not on file     Sexual activity: Not on file   Lifestyle     Physical activity     Days per week: Not on file     Minutes per session: Not on file     Stress: Not on file   Relationships     Social connections     Talks on phone: Not on file     Gets together: Not on file     Attends Denominational service: Not on file     Active member of club or organization: Not on file     Attends meetings of clubs or organizations: Not on file     Relationship status: Not on file     Intimate partner violence     Fear of current or ex partner: Not on file     Emotionally abused: Not on file     Physically abused: Not on file     Forced sexual activity: Not on file   Other Topics Concern     Not on file   Social History Narrative     Not on file       ROS:  10 point ROS of systems including Constitutional, Eyes, Respiratory, Cardiovascular, Gastroenterology, Genitourinary, Integumentary, Musculoskeletal, Psychiatric were all negative except for pertinent positives noted in my HPI.    EXAM:  Vitals:    05/14/21 1252   BP: 143/60   Pulse: 97   Resp: 18   Temp: 97.7  F (36.5  C)   SpO2: 97%   Weight: 117 lb 6.4 oz (53.3 kg)   Height: 5' 5\" (1.651 m)     GENERAL APPEARANCE:  Alert, in no distress, appears healthy, oriented, thin, cooperative, elderly woman in room  EYES:  EOM, conjunctivae, lids, pupils and irises normal  NECK:  No adenopathy,masses or thyromegaly  RESP:  Respiratory effort and palpation of chest normal, " lungs clear to auscultation, no respiratory distress  CV:  Palpation and auscultation of heart done , regular rate and rhythm, no murmur, rub, or gallop, no edema, +2 pedal pulses  ABDOMEN: Normal bowel sounds, soft, nontender, no hepatosplenomegaly or other masses  M/S:   Gait and station abnormal - STARR 2/2 patient being in bed; Digits and nails abnormal - arthritic changes present  SKIN:  Palpation of skin and subcutaneous tissue baseline, Inspection of skin and subcutaneous tissue normal. Skin thin and dry.  NEURO:   Cranial nerves 2-12 are normal tested and grossly at patient's baseline  PSYCH:  oriented X 3, normal insight, judgement and memory, affect and mood normal      LABS:   Reviewed in EPIC    ASSESSMENT/PLAN:      ICD-10-CM    1. Chronic midline low back pain with sciatica, sciatica laterality unspecified  M54.40 Acute on chronic - variable. Pain controlled today - she does note increased pain at times    G89.29 When she works with therapies. Managed on regimen of scheduled Tylenol.   2. Post herpetic neuralgia  B02.29 Chronic - stable. Managed on regimen of Gabapentin - no acute concerns today - continue meds as ordered.   3. Moderate episode of recurrent major depressive disorder (H)  F33.1 Chronic - stable? Appears pleasant, denies depressive sx today. maanged on Remeron and Trazodone. PHQ9 02/27. Continue medications as ordered; PHQ9 per protocol; in-house ACP psychology PRN.   4. Hyponatremia  E87.1 Acute - improving. Today's level up to 142.  CMP 5/18/21.   5. FTT (failure to thrive) in adult  R62.7 Acute on chronic - unstable, improving. She feels as though her appetite is improving as noted above, today's weight 117.4lbs - admission weight noted at 106lbs, doubtful of accuracy given dramatic uncharted drop from prior admission. No recent albumin level. CMP ordered as above. Does continue on Ensure two times a day.   6. PEDRO (acute kidney injury) (H)  N17.9 Acute - unstable, improving. Labs as  noted below - voiding without concern. Continue to encourage fluids - CMP recheck as noted above.   Ref. Range 5/11/2021 05:15 5/14/2021 06:41   BUN Latest Ref Range: 8 - 28 mg/dL 28 31 (H)   Creatinine Latest Ref Range: 0.60 - 1.10 mg/dL 1.60 (H) 1.48 (H)   GFR MDRD Af Amer Latest Ref Range: >60 mL/min/1.73m2 37 (L) 41 (L)   GFR MDRD Non Af Amer Latest Ref Range: >60 mL/min/1.73m2 31 (L) 34 (L)      7. Gastroesophageal reflux disease without esophagitis  K21.9 Chronic - stable. Managed on regimen of Omeprazole - however, noted drop in Hgb as stated below despite denial of GI concerns.    8. Anemia, unspecified type  D64.9 Acute on chronic - unstable. Encountered similar variabilities during last TCU stay of unknown origin at that time, she had discharged back into the community at that time prior to further workup - Recent drop as noted above. At this time we will discontinue Omeprazole. Start Protonix 40mg two times a day and Carafate 1g PO four times a day; attain Guaiac stools x3; Recheck a Hgb STAT then again on 5/16/21 - Recheck CBC, TIBC, iron level, folic acid level, Vit B12 level and Transferrin saturation on 5/18/21.   9. Physical deconditioning R53.81 Acute on chronic - unstable. 2/2 above noted diagnoses and recurrent hospitalizations. PT/OT following - adv per their recommendations. SW to assist with discharge planning.        Electronically signed by:   Dr. Omayra Torres, APRN, TESSA  [facility] [facility]  Office: [facility] Office: [address] [place] [address]    [place] Cell: [phone]  [place] Fax: [phone]    [facility] Phone: [phone]  [facility] Voicemail: [phone]

## 2021-07-22 NOTE — PROGRESS NOTES
[facility]   Nursing Home Regulatory Visit  06/16/21    Chief Complaint   Patient presents with     Review Of Multiple Medical Conditions     Regualtory        HPI:    Abeba Martinez is a 80 y.o.  (1940), who is being seen today for a federally mandated E/M visit at [facility] where she has resided since January of this year after an ER visit for placement due to concern for safety at home. I am meeting her for the first time today. She was hospitalized at [facility] from 4/25/21 to 4/27/21 with generalized weakness and FTT.       ALLERGIES:   No Known Allergies    PAST MEDICAL HISTORY:   Past Medical History:   Diagnosis Date     Adult failure to thrive      PEDRO (acute kidney injury) (H)      Altered mental status      Chronic low back pain      CKD (chronic kidney disease)      Elevated serum creatinine      GERD (gastroesophageal reflux disease)      Lumbar canal stenosis      Milk alkali syndrome        PAST SURGICAL HISTORY:   Past Surgical History:   Procedure Laterality Date     APPENDECTOMY       CATARACT EXTRACTION, BILATERAL       VEIN LIGATION AND STRIPPING         FAMILY HISTORY:   Family History   Problem Relation Age of Onset     Alcohol abuse Father      Lung cancer Father      Diabetes Father      Depression Brother         suicide       SOCIAL HISTORY:   Lives in a SNF    MEDICATIONS:  Current Outpatient Medications   Medication Sig     acetaminophen (TYLENOL) 500 MG tablet Take 1,000 mg by mouth 3 (three) times a day.     diclofenac sodium (VOLTAREN) 1 % Gel Apply 2 g topically 4 (four) times a day. To R neck     ferrous sulfate 324 mg (65 mg iron) TbEC Take 1 tablet by mouth. MWF     furosemide (LASIX) 10 mg Take 10 mg by mouth 2 (two) times a day.     gabapentin (NEURONTIN) 400 MG capsule Take 400 mg by mouth 2 (two) times a day.      loperamide (IMODIUM) 2 mg capsule Take 2 mg by mouth 4 (four) times a day as needed for diarrhea.     mirtazapine (REMERON) 7.5 MG  "tablet Take 7.5 mg by mouth at bedtime.     MULTIVITAMIN ORAL Take 1 tablet by mouth daily.     pantoprazole (PROTONIX) 40 MG tablet Take 40 mg by mouth 2 (two) times a day.     sucralfate (CARAFATE) 100 mg/mL suspension Take 1 g by mouth 4 (four) times a day.     traZODone (DESYREL) 150 MG tablet Take 150 mg by mouth at bedtime.         ROS:  4 point ROS neg other than the symptoms noted above in the HPI.    PHYSICAL EXAM:  /61   Pulse 62   Temp 97.6  F (36.4  C)   Resp 18   Ht 5' 5\" (1.651 m)   Wt 110 lb 1.9 oz (50 kg)   SpO2 95%   BMI 18.32 kg/m    Gen: sitting up in bed, alert, cooperative and in no acute distress  Resp: breathing non labored   Neuro: CX II-XII grossly in tact; ROM in all four extremities grossly in tact  Psych: alert and oriented to self and general situation    Lab/Diagnostic data:  Reviewed as per Epic    ASSESSMENT/PLAN    L3-5 Spinal Stenosis  Chronic Pain Syndrome  -- APAP 1000 mg three times a day, diclofenac gel four times a day, gabapentin 400 mg two times a day    Anemia  Recent Hgb in 7-8 range, which is a decrease from prior. No apparent source of bleeding. Fe low at 24 and Fe sat 9% in May. Hgb slowly trending up to 8.4 yesterday.   -- FeSO4 324 mg daily     Failure to Thrive  Protein Calorie Malnutrition   -- mirtazapine 7.5 mg at bedtime     Dependent LE Edema  Renal function noted. Weights a bit hard to interpret but looks to be down a few lbs from past months.   -- furosemide 10 mg two times a day    GERD  -- pantoprazole 40 mg two times a day and sucralfate 1g four times a day    Insomnia  -- trazodone 150 mg at bedtime     CKD, Stage III  Milk Alkali Syndrome  Baseline Cr 1.4-1.65  -- avoid nephrotoxic meds  -- periodic BMP    Electronically signed by:  Trista Arenas MD      "

## 2021-07-26 PROBLEM — M62.81 GENERALIZED MUSCLE WEAKNESS: Status: ACTIVE | Noted: 2021-07-26

## 2021-07-26 RX ORDER — ONDANSETRON 4 MG/1
4 TABLET, FILM COATED ORAL EVERY 6 HOURS PRN
COMMUNITY
Start: 2021-07-26

## 2021-07-26 NOTE — PROGRESS NOTES
[facility] Discharge Orders    Name: Abeba Martinez  : 1940  Planned Discharge Date: 21  Discharged to: new assisted living for patient [facility]    MEDICAL FOLLOW UP  Follow up with PCP in 7-10 day  Follow up with Specialists - None      FUTURE LABS: No labs orders/due    ORDER CHANGES:  None    DISCHARGE MEDICATIONS:  The patient s pharmacy is authorized to dispense a 30-day supply of medications. Refill requests should be directed to the primary provider, Laura Torres.   No narcotics are prescribed at time of discharge.   Current Outpatient Medications   Medication Sig Dispense Refill     acetaminophen (TYLENOL) 500 MG tablet [ACETAMINOPHEN (TYLENOL) 500 MG TABLET] Take 1,000 mg by mouth 3 (three) times a day.       famotidine (PEPCID) 40 MG tablet [FAMOTIDINE (PEPCID) 40 MG TABLET] Take 40 mg by mouth daily.       gabapentin (NEURONTIN) 300 MG capsule [GABAPENTIN (NEURONTIN) 300 MG CAPSULE] Take 300 mg by mouth 2 (two) times a day.       MULTIVITAMIN ORAL [MULTIVITAMIN ORAL] Take 1 tablet by mouth daily.       ondansetron (ZOFRAN) 4 MG tablet Take 1 tablet (4 mg) by mouth every 6 hours as needed for nausea       traZODone (DESYREL) 150 MG tablet [TRAZODONE (DESYREL) 150 MG TABLET] Take 150 mg by mouth at bedtime.          SERVICES:  Home Care:  Occupational Therapy, Physical Therapy, Registered Nurse, Home Health Aide,  and From:  [facility]    ADDITIONAL INSTRUCTIONS:  None    Dr. Omayra Torres, APRN, DNP  [facility]  Office: [address] [place] [address] [address] [place] [address]  [facility] Cell: [phone]  [facility] Fax: [phone]  [facility] Phone: [phone]  [facility] Voicemail: [phone]    Email: [email or web address]       This document was electronically signed on 2021

## 2021-07-26 NOTE — PROGRESS NOTES
"Progress Notes by Laura Torres NP at 7/9/2021  2:49 PM     Author: Laura Torres NP Service: -- Author Type: Nurse Practitioner    Filed: 7/25/2021  9:41 PM Encounter Date: 7/9/2021 Status: Signed    : Laura Torres NP (Nurse Practitioner)       [facility]    Facility:   [facility] [[id number]]   Code Status: DNR/DNI      CHIEF COMPLAINT/REASON FOR VISIT:  Chief Complaint   Patient presents with   ? Problem Visit     nausea, anemia       HISTORY:      HPI: Abeba is a 80 y.o. female  has a past medical history of Adult failure to thrive, PEDRO (acute kidney injury) (H), Altered mental status, Chronic low back pain, CKD (chronic kidney disease), Elevated serum creatinine, GERD (gastroesophageal reflux disease), Lumbar canal stenosis, and Milk alkali syndrome. Resident seen today for an acute visit in LTC per her/staff request 2/2 c/o ongoing nausea. Resident noting she \"just doesn't feel well\". Her VSS. Concern for further GI issues with ongoing anemia. Denies CP, palpitations, fatigue, vomiting, increased SOB/GONZALES, fever, chills, and/or b/b concerns today.    Past Medical History:   Diagnosis Date   ? Adult failure to thrive    ? PEDRO (acute kidney injury) (H)    ? Altered mental status    ? Chronic low back pain    ? CKD (chronic kidney disease)    ? Elevated serum creatinine    ? GERD (gastroesophageal reflux disease)    ? Lumbar canal stenosis    ? Milk alkali syndrome              Family History   Problem Relation Age of Onset   ? Alcohol abuse Father    ? Lung cancer Father    ? Diabetes Father    ? Depression Brother         suicide     Social History     Socioeconomic History   ? Marital status:      Spouse name: Not on file   ? Number of children: Not on file   ? Years of education: Not on file   ? Highest education level: Not on file   Occupational History   ? Not on file   Tobacco Use   ? Smoking status: Former Smoker     Packs/day: 0.50     Types: " Cigarettes   ? Smokeless tobacco: Never Used   Substance and Sexual Activity   ? Alcohol use: Yes   ? Drug use: Not on file   ? Sexual activity: Not on file   Other Topics Concern   ? Not on file   Social History Narrative   ? Not on file     Social Determinants of Health     Financial Resource Strain:    ? Difficulty of Paying Living Expenses:    Food Insecurity:    ? Worried About Running Out of Food in the Last Year:    ? Ran Out of Food in the Last Year:    Transportation Needs:    ? Lack of Transportation (Medical):    ? Lack of Transportation (Non-Medical):    Physical Activity:    ? Days of Exercise per Week:    ? Minutes of Exercise per Session:    Stress:    ? Feeling of Stress :    Social Connections:    ? Frequency of Communication with Friends and Family:    ? Frequency of Social Gatherings with Friends and Family:    ? Attends Sikh Services:    ? Active Member of Clubs or Organizations:    ? Attends Club or Organization Meetings:    ? Marital Status:    Intimate Partner Violence:    ? Fear of Current or Ex-Partner:    ? Emotionally Abused:    ? Physically Abused:    ? Sexually Abused:        ROS:  10 point ROS of systems including Constitutional, Eyes, Respiratory, Cardiovascular, Gastroenterology, Genitourinary, Integumentary, Musculoskeletal, Psychiatric were all negative except for pertinent positives noted in my HPI.    EXAM:  Vitals:    07/09/21 2136   BP: 122/73   Pulse: 88   Resp: 18   Temp: 97.2  F (36.2  C)   SpO2: 94%   Weight: 114 lb 12.8 oz (52.1 kg)     GENERAL APPEARANCE:  Alert, in no distress, thin, cooperative  RESP:  lungs clear to auscultation , no respiratory distress, cough -None  CV:  Palpation and auscultation of heart done , regular rate and rhythm, no murmur, rub, or gallop, no edema  ABDOMEN:  normal bowel sounds, soft, nontender, no hepatosplenomegaly or other masses  M/S:   Gait and station abnormal -Wheelchair use for long distances, using front wheeled walker in room;  Digits and nails abnormal -Arthritic changes present  SKIN:  Inspection of skin and subcutaneous tissue baseline, Skin thin, fragile, and dry  PSYCH:  oriented X 3, normal insight, judgement and memory, affect and mood normal      LABS:   Reviewed in EPIC    ASSESSMENT/PLAN:      ICD-10-CM    1. Gastroesophageal reflux disease without esophagitis  K21.9 Acute on chronic - unstable. Unsure of cause of nausea at this time. VS remain stable. Will   2. Anemia, unspecified type  D64.9 Start Zofran 4mg PO q6h PRN nausea and    3. Nausea  R11.0 Obtain a CBC and CMP on 7/13/2021.       Electronically signed by:   LEONARDO Woodson, TESSA  [facility]  Office: [facility] Office: [address] [place] [address]    [facility] Cell: [phone]  [facility] Fax: [phone]    [facility] Phone: [phone]  [facility] Voicemail: [phone]

## 2021-08-22 ENCOUNTER — HEALTH MAINTENANCE LETTER (OUTPATIENT)
Age: 81
End: 2021-08-22

## 2021-08-24 ENCOUNTER — LAB REQUISITION (OUTPATIENT)
Dept: LAB | Facility: CLINIC | Age: 81
End: 2021-08-24
Payer: COMMERCIAL

## 2021-08-24 DIAGNOSIS — N17.9 ACUTE KIDNEY FAILURE, UNSPECIFIED (H): ICD-10-CM

## 2021-08-24 DIAGNOSIS — D64.9 ANEMIA, UNSPECIFIED: ICD-10-CM

## 2021-08-25 LAB
ANION GAP SERPL CALCULATED.3IONS-SCNC: 8 MMOL/L (ref 5–18)
BASOPHILS # BLD AUTO: 0.1 10E3/UL (ref 0–0.2)
BASOPHILS NFR BLD AUTO: 1 %
BUN SERPL-MCNC: 44 MG/DL (ref 8–28)
CALCIUM SERPL-MCNC: 11 MG/DL (ref 8.5–10.5)
CHLORIDE BLD-SCNC: 113 MMOL/L (ref 98–107)
CO2 SERPL-SCNC: 22 MMOL/L (ref 22–31)
CREAT SERPL-MCNC: 1.84 MG/DL (ref 0.6–1.1)
EOSINOPHIL # BLD AUTO: 0.6 10E3/UL (ref 0–0.7)
EOSINOPHIL NFR BLD AUTO: 13 %
ERYTHROCYTE [DISTWIDTH] IN BLOOD BY AUTOMATED COUNT: 17 % (ref 10–15)
GFR SERPL CREATININE-BSD FRML MDRD: 25 ML/MIN/1.73M2
GLUCOSE BLD-MCNC: 89 MG/DL (ref 70–125)
HCT VFR BLD AUTO: 29.2 % (ref 35–47)
HGB BLD-MCNC: 9.2 G/DL (ref 11.7–15.7)
IMM GRANULOCYTES # BLD: 0 10E3/UL
IMM GRANULOCYTES NFR BLD: 0 %
LYMPHOCYTES # BLD AUTO: 0.9 10E3/UL (ref 0.8–5.3)
LYMPHOCYTES NFR BLD AUTO: 20 %
MCH RBC QN AUTO: 29.9 PG (ref 26.5–33)
MCHC RBC AUTO-ENTMCNC: 31.5 G/DL (ref 31.5–36.5)
MCV RBC AUTO: 95 FL (ref 78–100)
MONOCYTES # BLD AUTO: 0.7 10E3/UL (ref 0–1.3)
MONOCYTES NFR BLD AUTO: 15 %
NEUTROPHILS # BLD AUTO: 2.2 10E3/UL (ref 1.6–8.3)
NEUTROPHILS NFR BLD AUTO: 51 %
NRBC # BLD AUTO: 0 10E3/UL
NRBC BLD AUTO-RTO: 0 /100
PLATELET # BLD AUTO: 243 10E3/UL (ref 150–450)
POTASSIUM BLD-SCNC: 4.5 MMOL/L (ref 3.5–5)
RBC # BLD AUTO: 3.08 10E6/UL (ref 3.8–5.2)
SODIUM SERPL-SCNC: 143 MMOL/L (ref 136–145)
WBC # BLD AUTO: 4.4 10E3/UL (ref 4–11)

## 2021-08-25 PROCEDURE — 85025 COMPLETE CBC W/AUTO DIFF WBC: CPT | Mod: ORL | Performed by: NURSE PRACTITIONER

## 2021-08-25 PROCEDURE — 36415 COLL VENOUS BLD VENIPUNCTURE: CPT | Mod: ORL | Performed by: NURSE PRACTITIONER

## 2021-08-25 PROCEDURE — 80048 BASIC METABOLIC PNL TOTAL CA: CPT | Mod: ORL | Performed by: NURSE PRACTITIONER

## 2021-08-25 PROCEDURE — P9604 ONE-WAY ALLOW PRORATED TRIP: HCPCS | Mod: ORL | Performed by: NURSE PRACTITIONER

## 2021-10-02 ENCOUNTER — LAB REQUISITION (OUTPATIENT)
Dept: LAB | Facility: CLINIC | Age: 81
End: 2021-10-02
Payer: COMMERCIAL

## 2021-10-02 LAB
ALBUMIN UR-MCNC: 200 MG/DL
APPEARANCE UR: ABNORMAL
BACTERIA #/AREA URNS HPF: ABNORMAL /HPF
BILIRUB UR QL STRIP: NEGATIVE
COLOR UR AUTO: ABNORMAL
GLUCOSE UR STRIP-MCNC: NEGATIVE MG/DL
HGB UR QL STRIP: ABNORMAL
KETONES UR STRIP-MCNC: NEGATIVE MG/DL
LEUKOCYTE ESTERASE UR QL STRIP: ABNORMAL
MUCOUS THREADS #/AREA URNS LPF: PRESENT /LPF
NITRATE UR QL: POSITIVE
PH UR STRIP: 5.5 [PH] (ref 5–7)
RBC URINE: 50 /HPF
SP GR UR STRIP: 1.01 (ref 1–1.03)
SQUAMOUS EPITHELIAL: 1 /HPF
UROBILINOGEN UR STRIP-MCNC: <2 MG/DL
WBC URINE: 45 /HPF

## 2021-10-02 PROCEDURE — 87086 URINE CULTURE/COLONY COUNT: CPT | Mod: ORL

## 2021-10-02 PROCEDURE — 81001 URINALYSIS AUTO W/SCOPE: CPT | Mod: ORL

## 2021-10-02 PROCEDURE — 81001 URINALYSIS AUTO W/SCOPE: CPT | Mod: ORL | Performed by: NURSE PRACTITIONER

## 2021-10-02 PROCEDURE — 87086 URINE CULTURE/COLONY COUNT: CPT | Mod: ORL | Performed by: NURSE PRACTITIONER

## 2021-10-05 ENCOUNTER — LAB REQUISITION (OUTPATIENT)
Dept: LAB | Facility: CLINIC | Age: 81
End: 2021-10-05
Payer: COMMERCIAL

## 2021-10-05 DIAGNOSIS — R53.1 WEAKNESS: ICD-10-CM

## 2021-10-05 LAB
BACTERIA UR CULT: ABNORMAL
BACTERIA UR CULT: ABNORMAL

## 2021-10-06 LAB
ANION GAP SERPL CALCULATED.3IONS-SCNC: 9 MMOL/L (ref 5–18)
BUN SERPL-MCNC: 37 MG/DL (ref 8–28)
CALCIUM SERPL-MCNC: 13.8 MG/DL (ref 8.5–10.5)
CHLORIDE BLD-SCNC: 109 MMOL/L (ref 98–107)
CO2 SERPL-SCNC: 24 MMOL/L (ref 22–31)
CREAT SERPL-MCNC: 2.03 MG/DL (ref 0.6–1.1)
ERYTHROCYTE [DISTWIDTH] IN BLOOD BY AUTOMATED COUNT: 16.9 % (ref 10–15)
GFR SERPL CREATININE-BSD FRML MDRD: 22 ML/MIN/1.73M2
GLUCOSE BLD-MCNC: 94 MG/DL (ref 70–125)
HCT VFR BLD AUTO: 31.1 % (ref 35–47)
HGB BLD-MCNC: 9.7 G/DL (ref 11.7–15.7)
MCH RBC QN AUTO: 29.8 PG (ref 26.5–33)
MCHC RBC AUTO-ENTMCNC: 31.2 G/DL (ref 31.5–36.5)
MCV RBC AUTO: 95 FL (ref 78–100)
PLATELET # BLD AUTO: 193 10E3/UL (ref 150–450)
POTASSIUM BLD-SCNC: 4.9 MMOL/L (ref 3.5–5)
RBC # BLD AUTO: 3.26 10E6/UL (ref 3.8–5.2)
SODIUM SERPL-SCNC: 142 MMOL/L (ref 136–145)
WBC # BLD AUTO: 4.5 10E3/UL (ref 4–11)

## 2021-10-06 PROCEDURE — 36415 COLL VENOUS BLD VENIPUNCTURE: CPT | Mod: ORL | Performed by: NURSE PRACTITIONER

## 2021-10-06 PROCEDURE — 80048 BASIC METABOLIC PNL TOTAL CA: CPT | Mod: ORL | Performed by: NURSE PRACTITIONER

## 2021-10-06 PROCEDURE — 85027 COMPLETE CBC AUTOMATED: CPT | Mod: ORL | Performed by: NURSE PRACTITIONER

## 2021-10-06 PROCEDURE — P9603 ONE-WAY ALLOW PRORATED MILES: HCPCS | Mod: ORL | Performed by: NURSE PRACTITIONER

## 2021-10-17 ENCOUNTER — HEALTH MAINTENANCE LETTER (OUTPATIENT)
Age: 81
End: 2021-10-17

## 2022-01-01 ENCOUNTER — LAB REQUISITION (OUTPATIENT)
Dept: LAB | Facility: CLINIC | Age: 82
End: 2022-01-01
Payer: COMMERCIAL

## 2022-01-01 ENCOUNTER — HEALTH MAINTENANCE LETTER (OUTPATIENT)
Age: 82
End: 2022-01-01

## 2022-01-01 VITALS
DIASTOLIC BLOOD PRESSURE: 46 MMHG | RESPIRATION RATE: 16 BRPM | HEART RATE: 95 BPM | WEIGHT: 112.4 LBS | TEMPERATURE: 98.1 F | SYSTOLIC BLOOD PRESSURE: 89 MMHG | OXYGEN SATURATION: 98 % | BODY MASS INDEX: 18.73 KG/M2 | HEIGHT: 65 IN

## 2022-01-01 VITALS
HEIGHT: 65 IN | SYSTOLIC BLOOD PRESSURE: 143 MMHG | BODY MASS INDEX: 19.56 KG/M2 | HEART RATE: 97 BPM | DIASTOLIC BLOOD PRESSURE: 60 MMHG | RESPIRATION RATE: 18 BRPM | OXYGEN SATURATION: 97 % | WEIGHT: 117.4 LBS | TEMPERATURE: 97.7 F

## 2022-01-01 VITALS
HEART RATE: 102 BPM | BODY MASS INDEX: 17.79 KG/M2 | WEIGHT: 106.8 LBS | TEMPERATURE: 98 F | HEIGHT: 65 IN | DIASTOLIC BLOOD PRESSURE: 65 MMHG | SYSTOLIC BLOOD PRESSURE: 152 MMHG | RESPIRATION RATE: 18 BRPM | OXYGEN SATURATION: 99 %

## 2022-01-01 VITALS
RESPIRATION RATE: 16 BRPM | HEART RATE: 92 BPM | HEIGHT: 65 IN | WEIGHT: 106.8 LBS | OXYGEN SATURATION: 100 % | WEIGHT: 112.4 LBS | SYSTOLIC BLOOD PRESSURE: 129 MMHG | TEMPERATURE: 96.3 F | OXYGEN SATURATION: 95 % | RESPIRATION RATE: 18 BRPM | DIASTOLIC BLOOD PRESSURE: 65 MMHG | DIASTOLIC BLOOD PRESSURE: 74 MMHG | SYSTOLIC BLOOD PRESSURE: 109 MMHG | BODY MASS INDEX: 18.73 KG/M2 | BODY MASS INDEX: 17.79 KG/M2 | TEMPERATURE: 97.5 F | HEIGHT: 65 IN | HEART RATE: 86 BPM

## 2022-01-01 VITALS
HEIGHT: 65 IN | OXYGEN SATURATION: 99 % | DIASTOLIC BLOOD PRESSURE: 56 MMHG | SYSTOLIC BLOOD PRESSURE: 99 MMHG | HEART RATE: 89 BPM | WEIGHT: 117.4 LBS | TEMPERATURE: 98.1 F | BODY MASS INDEX: 19.56 KG/M2 | RESPIRATION RATE: 18 BRPM

## 2022-01-01 VITALS
DIASTOLIC BLOOD PRESSURE: 64 MMHG | TEMPERATURE: 97.7 F | RESPIRATION RATE: 18 BRPM | SYSTOLIC BLOOD PRESSURE: 145 MMHG | HEART RATE: 114 BPM | WEIGHT: 114.3 LBS | OXYGEN SATURATION: 95 % | HEIGHT: 65 IN | BODY MASS INDEX: 19.04 KG/M2

## 2022-01-01 VITALS
BODY MASS INDEX: 18.73 KG/M2 | SYSTOLIC BLOOD PRESSURE: 148 MMHG | HEIGHT: 65 IN | HEART RATE: 103 BPM | OXYGEN SATURATION: 93 % | DIASTOLIC BLOOD PRESSURE: 57 MMHG | TEMPERATURE: 97.1 F | RESPIRATION RATE: 18 BRPM | WEIGHT: 112.4 LBS

## 2022-01-01 VITALS
HEIGHT: 65 IN | DIASTOLIC BLOOD PRESSURE: 54 MMHG | RESPIRATION RATE: 16 BRPM | OXYGEN SATURATION: 96 % | SYSTOLIC BLOOD PRESSURE: 124 MMHG | WEIGHT: 102.4 LBS | HEART RATE: 95 BPM | BODY MASS INDEX: 17.06 KG/M2 | TEMPERATURE: 98.8 F

## 2022-01-01 VITALS
HEIGHT: 65 IN | TEMPERATURE: 97.7 F | HEART RATE: 82 BPM | OXYGEN SATURATION: 95 % | BODY MASS INDEX: 19.04 KG/M2 | DIASTOLIC BLOOD PRESSURE: 62 MMHG | WEIGHT: 114.3 LBS | SYSTOLIC BLOOD PRESSURE: 137 MMHG | RESPIRATION RATE: 16 BRPM

## 2022-01-01 VITALS
BODY MASS INDEX: 19.53 KG/M2 | RESPIRATION RATE: 18 BRPM | DIASTOLIC BLOOD PRESSURE: 51 MMHG | BODY MASS INDEX: 18.35 KG/M2 | HEART RATE: 62 BPM | SYSTOLIC BLOOD PRESSURE: 105 MMHG | HEART RATE: 88 BPM | WEIGHT: 117.2 LBS | OXYGEN SATURATION: 98 % | HEIGHT: 65 IN | HEIGHT: 65 IN | WEIGHT: 110.12 LBS | SYSTOLIC BLOOD PRESSURE: 118 MMHG | TEMPERATURE: 97.6 F | RESPIRATION RATE: 18 BRPM | DIASTOLIC BLOOD PRESSURE: 61 MMHG | OXYGEN SATURATION: 95 % | TEMPERATURE: 97.7 F

## 2022-01-01 VITALS
WEIGHT: 117.4 LBS | HEART RATE: 98 BPM | HEIGHT: 65 IN | TEMPERATURE: 97.8 F | RESPIRATION RATE: 18 BRPM | OXYGEN SATURATION: 97 % | DIASTOLIC BLOOD PRESSURE: 54 MMHG | BODY MASS INDEX: 19.56 KG/M2 | SYSTOLIC BLOOD PRESSURE: 141 MMHG

## 2022-01-01 VITALS
DIASTOLIC BLOOD PRESSURE: 57 MMHG | SYSTOLIC BLOOD PRESSURE: 103 MMHG | WEIGHT: 109.8 LBS | TEMPERATURE: 97.6 F | HEART RATE: 90 BPM

## 2022-01-01 DIAGNOSIS — I10 ESSENTIAL (PRIMARY) HYPERTENSION: ICD-10-CM

## 2022-01-01 DIAGNOSIS — D64.9 ANEMIA, UNSPECIFIED: ICD-10-CM

## 2022-01-01 DIAGNOSIS — D63.8 ANEMIA IN OTHER CHRONIC DISEASES CLASSIFIED ELSEWHERE: ICD-10-CM

## 2022-01-01 DIAGNOSIS — R53.1 WEAKNESS: ICD-10-CM

## 2022-01-01 DIAGNOSIS — E83.52 HYPERCALCEMIA: ICD-10-CM

## 2022-01-01 DIAGNOSIS — N18.9 CHRONIC KIDNEY DISEASE, UNSPECIFIED: ICD-10-CM

## 2022-01-01 DIAGNOSIS — R60.0 LOCALIZED EDEMA: ICD-10-CM

## 2022-01-01 DIAGNOSIS — N17.9 ACUTE KIDNEY FAILURE, UNSPECIFIED (H): ICD-10-CM

## 2022-01-01 DIAGNOSIS — M79.10 MYALGIA, UNSPECIFIED SITE: ICD-10-CM

## 2022-01-01 LAB
ALBUMIN PERCENT: 61.5 % (ref 51–67)
ALBUMIN SERPL ELPH-MCNC: 2.8 G/DL (ref 3.2–4.7)
ALPHA 1 PERCENT: 4.1 % (ref 2–4)
ALPHA 2 PERCENT: 14.8 % (ref 5–13)
ALPHA1 GLOB SERPL ELPH-MCNC: 0.2 G/DL (ref 0.1–0.3)
ALPHA2 GLOB SERPL ELPH-MCNC: 0.7 G/DL (ref 0.4–0.9)
ANION GAP SERPL CALCULATED.3IONS-SCNC: 10 MMOL/L (ref 5–18)
ANION GAP SERPL CALCULATED.3IONS-SCNC: 10 MMOL/L (ref 5–18)
ANION GAP SERPL CALCULATED.3IONS-SCNC: 11 MMOL/L (ref 5–18)
ANION GAP SERPL CALCULATED.3IONS-SCNC: 12 MMOL/L (ref 7–15)
ANION GAP SERPL CALCULATED.3IONS-SCNC: 6 MMOL/L (ref 5–18)
ANION GAP SERPL CALCULATED.3IONS-SCNC: 7 MMOL/L (ref 5–18)
ANION GAP SERPL CALCULATED.3IONS-SCNC: 8 MMOL/L (ref 5–18)
ANION GAP SERPL CALCULATED.3IONS-SCNC: 8 MMOL/L (ref 5–18)
ANION GAP SERPL CALCULATED.3IONS-SCNC: 9 MMOL/L (ref 5–18)
B-GLOBULIN SERPL ELPH-MCNC: 0.6 G/DL (ref 0.7–1.2)
BETA PERCENT: 14 % (ref 10–17)
BNP SERPL-MCNC: 87 PG/ML (ref 0–167)
BUN SERPL-MCNC: 31 MG/DL (ref 8–28)
BUN SERPL-MCNC: 34 MG/DL (ref 8–28)
BUN SERPL-MCNC: 34 MG/DL (ref 8–28)
BUN SERPL-MCNC: 38 MG/DL (ref 8–28)
BUN SERPL-MCNC: 40 MG/DL (ref 8–28)
BUN SERPL-MCNC: 40 MG/DL (ref 8–28)
BUN SERPL-MCNC: 41 MG/DL (ref 8–28)
BUN SERPL-MCNC: 44 MG/DL (ref 8–28)
BUN SERPL-MCNC: 46 MG/DL (ref 8–28)
BUN SERPL-MCNC: 48.6 MG/DL (ref 8–23)
BUN SERPL-MCNC: 69 MG/DL (ref 8–28)
CALCIUM SERPL-MCNC: 10 MG/DL (ref 8.5–10.5)
CALCIUM SERPL-MCNC: 10.4 MG/DL (ref 8.5–10.5)
CALCIUM SERPL-MCNC: 10.6 MG/DL (ref 8.5–10.5)
CALCIUM SERPL-MCNC: 10.6 MG/DL (ref 8.5–10.5)
CALCIUM SERPL-MCNC: 10.7 MG/DL (ref 8.5–10.5)
CALCIUM SERPL-MCNC: 10.7 MG/DL (ref 8.8–10.2)
CALCIUM SERPL-MCNC: 11 MG/DL (ref 8.5–10.5)
CALCIUM SERPL-MCNC: 11.1 MG/DL (ref 8.5–10.5)
CALCIUM SERPL-MCNC: 8.3 MG/DL (ref 8.5–10.5)
CALCIUM SERPL-MCNC: 8.6 MG/DL (ref 8.5–10.5)
CALCIUM SERPL-MCNC: 8.7 MG/DL (ref 8.5–10.5)
CHLORIDE BLD-SCNC: 105 MMOL/L (ref 98–107)
CHLORIDE BLD-SCNC: 107 MMOL/L (ref 98–107)
CHLORIDE BLD-SCNC: 109 MMOL/L (ref 98–107)
CHLORIDE BLD-SCNC: 111 MMOL/L (ref 98–107)
CHLORIDE BLD-SCNC: 111 MMOL/L (ref 98–107)
CHLORIDE BLD-SCNC: 112 MMOL/L (ref 98–107)
CHLORIDE BLD-SCNC: 114 MMOL/L (ref 98–107)
CHLORIDE BLD-SCNC: 115 MMOL/L (ref 98–107)
CHLORIDE BLD-SCNC: 119 MMOL/L (ref 98–107)
CHLORIDE BLD-SCNC: 119 MMOL/L (ref 98–107)
CHLORIDE SERPL-SCNC: 110 MMOL/L (ref 98–107)
CK SERPL-CCNC: <10 U/L (ref 30–190)
CO2 SERPL-SCNC: 16 MMOL/L (ref 22–31)
CO2 SERPL-SCNC: 17 MMOL/L (ref 22–31)
CO2 SERPL-SCNC: 17 MMOL/L (ref 22–31)
CO2 SERPL-SCNC: 19 MMOL/L (ref 22–31)
CO2 SERPL-SCNC: 22 MMOL/L (ref 22–31)
CO2 SERPL-SCNC: 23 MMOL/L (ref 22–31)
CO2 SERPL-SCNC: 23 MMOL/L (ref 22–31)
CO2 SERPL-SCNC: 24 MMOL/L (ref 22–31)
CO2 SERPL-SCNC: 26 MMOL/L (ref 22–31)
CO2 SERPL-SCNC: 29 MMOL/L (ref 22–31)
CREAT SERPL-MCNC: 1.56 MG/DL (ref 0.6–1.1)
CREAT SERPL-MCNC: 1.58 MG/DL (ref 0.6–1.1)
CREAT SERPL-MCNC: 1.79 MG/DL (ref 0.6–1.1)
CREAT SERPL-MCNC: 1.79 MG/DL (ref 0.6–1.1)
CREAT SERPL-MCNC: 1.85 MG/DL (ref 0.6–1.1)
CREAT SERPL-MCNC: 1.89 MG/DL (ref 0.6–1.1)
CREAT SERPL-MCNC: 1.9 MG/DL (ref 0.6–1.1)
CREAT SERPL-MCNC: 2.04 MG/DL (ref 0.6–1.1)
CREAT SERPL-MCNC: 2.05 MG/DL (ref 0.6–1.1)
CREAT SERPL-MCNC: 2.07 MG/DL (ref 0.51–0.95)
CREAT SERPL-MCNC: 2.11 MG/DL (ref 0.6–1.1)
DEPRECATED HCO3 PLAS-SCNC: 20 MMOL/L (ref 22–29)
ERYTHROCYTE [DISTWIDTH] IN BLOOD BY AUTOMATED COUNT: 15.3 % (ref 10–15)
ERYTHROCYTE [DISTWIDTH] IN BLOOD BY AUTOMATED COUNT: 16 % (ref 10–15)
ERYTHROCYTE [DISTWIDTH] IN BLOOD BY AUTOMATED COUNT: 17.1 % (ref 10–15)
ERYTHROCYTE [DISTWIDTH] IN BLOOD BY AUTOMATED COUNT: 20.8 % (ref 10–15)
ERYTHROCYTE [DISTWIDTH] IN BLOOD BY AUTOMATED COUNT: 22.5 % (ref 10–15)
ERYTHROCYTE [DISTWIDTH] IN BLOOD BY AUTOMATED COUNT: 23.6 % (ref 10–15)
ERYTHROCYTE [SEDIMENTATION RATE] IN BLOOD BY WESTERGREN METHOD: 25 MM/HR (ref 0–20)
GAMMA GLOB SERPL ELPH-MCNC: 0.3 G/DL (ref 0.6–1.4)
GAMMA GLOBULIN PERCENT: 5.6 % (ref 9–20)
GFR SERPL CREATININE-BSD FRML MDRD: 23 ML/MIN/1.73M2
GFR SERPL CREATININE-BSD FRML MDRD: 23 ML/MIN/1.73M2
GFR SERPL CREATININE-BSD FRML MDRD: 24 ML/MIN/1.73M2
GFR SERPL CREATININE-BSD FRML MDRD: 24 ML/MIN/1.73M2
GFR SERPL CREATININE-BSD FRML MDRD: 26 ML/MIN/1.73M2
GFR SERPL CREATININE-BSD FRML MDRD: 26 ML/MIN/1.73M2
GFR SERPL CREATININE-BSD FRML MDRD: 27 ML/MIN/1.73M2
GFR SERPL CREATININE-BSD FRML MDRD: 28 ML/MIN/1.73M2
GFR SERPL CREATININE-BSD FRML MDRD: 28 ML/MIN/1.73M2
GFR SERPL CREATININE-BSD FRML MDRD: 32 ML/MIN/1.73M2
GFR SERPL CREATININE-BSD FRML MDRD: 33 ML/MIN/1.73M2
GLUCOSE BLD-MCNC: 176 MG/DL (ref 70–125)
GLUCOSE BLD-MCNC: 71 MG/DL (ref 70–125)
GLUCOSE BLD-MCNC: 82 MG/DL (ref 70–125)
GLUCOSE BLD-MCNC: 85 MG/DL (ref 70–125)
GLUCOSE BLD-MCNC: 85 MG/DL (ref 70–125)
GLUCOSE BLD-MCNC: 89 MG/DL (ref 70–125)
GLUCOSE BLD-MCNC: 90 MG/DL (ref 70–125)
GLUCOSE BLD-MCNC: 90 MG/DL (ref 70–125)
GLUCOSE BLD-MCNC: 91 MG/DL (ref 70–125)
GLUCOSE BLD-MCNC: 93 MG/DL (ref 70–125)
GLUCOSE SERPL-MCNC: 76 MG/DL (ref 70–99)
HCT VFR BLD AUTO: 20.4 % (ref 35–47)
HCT VFR BLD AUTO: 21.9 % (ref 35–47)
HCT VFR BLD AUTO: 22.6 % (ref 35–47)
HCT VFR BLD AUTO: 32.2 % (ref 35–47)
HCT VFR BLD AUTO: 34.2 % (ref 35–47)
HCT VFR BLD AUTO: 35.1 % (ref 35–47)
HGB BLD-MCNC: 10.1 G/DL (ref 11.7–15.7)
HGB BLD-MCNC: 11 G/DL (ref 11.7–15.7)
HGB BLD-MCNC: 11.3 G/DL (ref 11.7–15.7)
HGB BLD-MCNC: 11.5 G/DL (ref 11.7–15.7)
HGB BLD-MCNC: 6.1 G/DL (ref 11.7–15.7)
HGB BLD-MCNC: 6.7 G/DL (ref 11.7–15.7)
HGB BLD-MCNC: 7 G/DL (ref 11.7–15.7)
HGB BLD-MCNC: 7.1 G/DL (ref 11.7–15.7)
HGB BLD-MCNC: 7.5 G/DL (ref 11.7–15.7)
HGB BLD-MCNC: 8 G/DL (ref 11.7–15.7)
HGB BLD-MCNC: 8.4 G/DL (ref 11.7–15.7)
HGB BLD-MCNC: 8.5 G/DL (ref 11.7–15.7)
MCH RBC QN AUTO: 29.1 PG (ref 26.5–33)
MCH RBC QN AUTO: 30 PG (ref 26.5–33)
MCH RBC QN AUTO: 30.6 PG (ref 26.5–33)
MCH RBC QN AUTO: 30.7 PG (ref 26.5–33)
MCH RBC QN AUTO: 30.9 PG (ref 26.5–33)
MCH RBC QN AUTO: 31.1 PG (ref 26.5–33)
MCHC RBC AUTO-ENTMCNC: 29.9 G/DL (ref 31.5–36.5)
MCHC RBC AUTO-ENTMCNC: 30.6 G/DL (ref 31.5–36.5)
MCHC RBC AUTO-ENTMCNC: 31 G/DL (ref 31.5–36.5)
MCHC RBC AUTO-ENTMCNC: 31.4 G/DL (ref 31.5–36.5)
MCHC RBC AUTO-ENTMCNC: 32.2 G/DL (ref 31.5–36.5)
MCHC RBC AUTO-ENTMCNC: 32.2 G/DL (ref 31.5–36.5)
MCV RBC AUTO: 100 FL (ref 78–100)
MCV RBC AUTO: 100 FL (ref 78–100)
MCV RBC AUTO: 103 FL (ref 78–100)
MCV RBC AUTO: 91 FL (ref 78–100)
MCV RBC AUTO: 93 FL (ref 78–100)
MCV RBC AUTO: 99 FL (ref 78–100)
PATH ICD:: ABNORMAL
PLATELET # BLD AUTO: 179 10E3/UL (ref 150–450)
PLATELET # BLD AUTO: 182 10E3/UL (ref 150–450)
PLATELET # BLD AUTO: 197 10E3/UL (ref 150–450)
PLATELET # BLD AUTO: 199 10E3/UL (ref 150–450)
PLATELET # BLD AUTO: 216 10E3/UL (ref 150–450)
PLATELET # BLD AUTO: 227 10E3/UL (ref 150–450)
POTASSIUM BLD-SCNC: 3.3 MMOL/L (ref 3.5–5)
POTASSIUM BLD-SCNC: 3.4 MMOL/L (ref 3.5–5)
POTASSIUM BLD-SCNC: 3.7 MMOL/L (ref 3.5–5)
POTASSIUM BLD-SCNC: 3.7 MMOL/L (ref 3.5–5)
POTASSIUM BLD-SCNC: 3.8 MMOL/L (ref 3.5–5)
POTASSIUM BLD-SCNC: 3.9 MMOL/L (ref 3.5–5)
POTASSIUM BLD-SCNC: 4.1 MMOL/L (ref 3.5–5)
POTASSIUM BLD-SCNC: 4.3 MMOL/L (ref 3.5–5)
POTASSIUM BLD-SCNC: 4.4 MMOL/L (ref 3.5–5)
POTASSIUM BLD-SCNC: 4.6 MMOL/L (ref 3.5–5)
POTASSIUM SERPL-SCNC: 5 MMOL/L (ref 3.4–5.3)
PROT PATTERN SERPL ELPH-IMP: ABNORMAL
RBC # BLD AUTO: 1.99 10E6/UL (ref 3.8–5.2)
RBC # BLD AUTO: 2.19 10E6/UL (ref 3.8–5.2)
RBC # BLD AUTO: 2.25 10E6/UL (ref 3.8–5.2)
RBC # BLD AUTO: 3.27 10E6/UL (ref 3.8–5.2)
RBC # BLD AUTO: 3.67 10E6/UL (ref 3.8–5.2)
RBC # BLD AUTO: 3.88 10E6/UL (ref 3.8–5.2)
REVIEWING PATHOLOGIST: ABNORMAL
SODIUM SERPL-SCNC: 137 MMOL/L (ref 136–145)
SODIUM SERPL-SCNC: 142 MMOL/L (ref 136–145)
SODIUM SERPL-SCNC: 143 MMOL/L (ref 136–145)
SODIUM SERPL-SCNC: 144 MMOL/L (ref 136–145)
SODIUM SERPL-SCNC: 145 MMOL/L (ref 136–145)
TOTAL PROTEIN SERUM FOR ELP (SYNCED VALUE): 4.6 G/DL
WBC # BLD AUTO: 13.5 10E3/UL (ref 4–11)
WBC # BLD AUTO: 5.6 10E3/UL (ref 4–11)
WBC # BLD AUTO: 5.8 10E3/UL (ref 4–11)
WBC # BLD AUTO: 6.1 10E3/UL (ref 4–11)
WBC # BLD AUTO: 7.4 10E3/UL (ref 4–11)
WBC # BLD AUTO: 7.8 10E3/UL (ref 4–11)

## 2022-01-01 PROCEDURE — 80048 BASIC METABOLIC PNL TOTAL CA: CPT | Mod: ORL | Performed by: NURSE PRACTITIONER

## 2022-01-01 PROCEDURE — 36415 COLL VENOUS BLD VENIPUNCTURE: CPT | Mod: ORL | Performed by: INTERNAL MEDICINE

## 2022-01-01 PROCEDURE — 80048 BASIC METABOLIC PNL TOTAL CA: CPT | Mod: ORL | Performed by: INTERNAL MEDICINE

## 2022-01-01 PROCEDURE — P9604 ONE-WAY ALLOW PRORATED TRIP: HCPCS | Mod: ORL | Performed by: NURSE PRACTITIONER

## 2022-01-01 PROCEDURE — 82550 ASSAY OF CK (CPK): CPT | Mod: ORL | Performed by: INTERNAL MEDICINE

## 2022-01-01 PROCEDURE — P9604 ONE-WAY ALLOW PRORATED TRIP: HCPCS | Mod: ORL | Performed by: INTERNAL MEDICINE

## 2022-01-01 PROCEDURE — 85027 COMPLETE CBC AUTOMATED: CPT | Mod: ORL | Performed by: NURSE PRACTITIONER

## 2022-01-01 PROCEDURE — P9603 ONE-WAY ALLOW PRORATED MILES: HCPCS | Mod: ORL | Performed by: INTERNAL MEDICINE

## 2022-01-01 PROCEDURE — 85018 HEMOGLOBIN: CPT | Mod: ORL | Performed by: INTERNAL MEDICINE

## 2022-01-01 PROCEDURE — 36415 COLL VENOUS BLD VENIPUNCTURE: CPT | Mod: ORL | Performed by: NURSE PRACTITIONER

## 2022-01-01 PROCEDURE — 85652 RBC SED RATE AUTOMATED: CPT | Mod: ORL | Performed by: INTERNAL MEDICINE

## 2022-01-01 PROCEDURE — 85027 COMPLETE CBC AUTOMATED: CPT | Mod: ORL | Performed by: INTERNAL MEDICINE

## 2022-01-01 PROCEDURE — 36415 COLL VENOUS BLD VENIPUNCTURE: CPT | Performed by: INTERNAL MEDICINE

## 2022-01-01 PROCEDURE — 85027 COMPLETE CBC AUTOMATED: CPT | Performed by: INTERNAL MEDICINE

## 2022-01-01 PROCEDURE — 80048 BASIC METABOLIC PNL TOTAL CA: CPT | Performed by: INTERNAL MEDICINE

## 2022-01-01 PROCEDURE — 83880 ASSAY OF NATRIURETIC PEPTIDE: CPT | Mod: ORL | Performed by: INTERNAL MEDICINE
